# Patient Record
Sex: FEMALE | Race: WHITE | NOT HISPANIC OR LATINO | Employment: FULL TIME | URBAN - METROPOLITAN AREA
[De-identification: names, ages, dates, MRNs, and addresses within clinical notes are randomized per-mention and may not be internally consistent; named-entity substitution may affect disease eponyms.]

---

## 2019-04-08 ENCOUNTER — TELEPHONE (OUTPATIENT)
Dept: OBGYN CLINIC | Facility: CLINIC | Age: 44
End: 2019-04-08

## 2019-05-01 ENCOUNTER — OFFICE VISIT (OUTPATIENT)
Dept: OBGYN CLINIC | Facility: CLINIC | Age: 44
End: 2019-05-01
Payer: COMMERCIAL

## 2019-05-01 VITALS
WEIGHT: 162 LBS | BODY MASS INDEX: 26.99 KG/M2 | HEIGHT: 65 IN | DIASTOLIC BLOOD PRESSURE: 80 MMHG | SYSTOLIC BLOOD PRESSURE: 116 MMHG

## 2019-05-01 DIAGNOSIS — N92.0 MENORRHAGIA WITH REGULAR CYCLE: ICD-10-CM

## 2019-05-01 DIAGNOSIS — B37.9 YEAST INFECTION: Primary | ICD-10-CM

## 2019-05-01 PROCEDURE — 99204 OFFICE O/P NEW MOD 45 MIN: CPT | Performed by: PHYSICIAN ASSISTANT

## 2019-05-01 RX ORDER — FLUCONAZOLE 150 MG/1
150 TABLET ORAL EVERY OTHER DAY
Qty: 2 TABLET | Refills: 0 | Status: SHIPPED | OUTPATIENT
Start: 2019-05-01 | End: 2019-05-04

## 2019-06-06 ENCOUNTER — ANNUAL EXAM (OUTPATIENT)
Dept: OBGYN CLINIC | Facility: CLINIC | Age: 44
End: 2019-06-06
Payer: COMMERCIAL

## 2019-06-06 VITALS
BODY MASS INDEX: 28 KG/M2 | WEIGHT: 164 LBS | SYSTOLIC BLOOD PRESSURE: 106 MMHG | HEIGHT: 64 IN | DIASTOLIC BLOOD PRESSURE: 68 MMHG

## 2019-06-06 DIAGNOSIS — N94.6 DYSMENORRHEA: ICD-10-CM

## 2019-06-06 DIAGNOSIS — Z12.31 ENCOUNTER FOR SCREENING MAMMOGRAM FOR MALIGNANT NEOPLASM OF BREAST: ICD-10-CM

## 2019-06-06 DIAGNOSIS — Z01.419 GYNECOLOGIC EXAM NORMAL: Primary | ICD-10-CM

## 2019-06-06 PROCEDURE — S0612 ANNUAL GYNECOLOGICAL EXAMINA: HCPCS | Performed by: PHYSICIAN ASSISTANT

## 2019-06-13 ENCOUNTER — TELEPHONE (OUTPATIENT)
Dept: OBGYN CLINIC | Facility: CLINIC | Age: 44
End: 2019-06-13

## 2019-06-13 DIAGNOSIS — N92.0 MENORRHAGIA WITH REGULAR CYCLE: ICD-10-CM

## 2019-06-13 DIAGNOSIS — N94.6 DYSMENORRHEA: Primary | ICD-10-CM

## 2019-06-13 RX ORDER — DESOGESTREL AND ETHINYL ESTRADIOL 21-5 (28)
1 KIT ORAL DAILY
Qty: 28 TABLET | Refills: 3 | Status: SHIPPED | OUTPATIENT
Start: 2019-06-13 | End: 2019-09-19 | Stop reason: SDUPTHER

## 2019-06-14 ENCOUNTER — HOSPITAL ENCOUNTER (OUTPATIENT)
Dept: MAMMOGRAPHY | Facility: HOSPITAL | Age: 44
Discharge: HOME | End: 2019-06-14
Attending: PHYSICIAN ASSISTANT
Payer: COMMERCIAL

## 2019-06-14 VITALS — HEIGHT: 64 IN | BODY MASS INDEX: 28.17 KG/M2 | WEIGHT: 165 LBS

## 2019-06-14 DIAGNOSIS — Z12.31 ENCOUNTER FOR SCREENING MAMMOGRAM FOR MALIGNANT NEOPLASM OF BREAST: ICD-10-CM

## 2019-06-14 PROCEDURE — 77067 SCR MAMMO BI INCL CAD: CPT

## 2019-06-14 PROCEDURE — 77063 BREAST TOMOSYNTHESIS BI: CPT

## 2019-06-18 LAB
HPV HR 12 DNA CVX QL NAA+PROBE: DETECTED
HPV16 DNA SPEC QL NAA+PROBE: NOT DETECTED
HPV18 DNA SPEC QL NAA+PROBE: NOT DETECTED
THIN PREP CVX: ABNORMAL

## 2019-06-26 ENCOUNTER — TELEPHONE (OUTPATIENT)
Dept: OBGYN CLINIC | Facility: CLINIC | Age: 44
End: 2019-06-26

## 2019-07-05 ENCOUNTER — TELEPHONE (OUTPATIENT)
Dept: MAMMOGRAPHY | Facility: CLINIC | Age: 44
End: 2019-07-05

## 2019-07-12 ENCOUNTER — TELEPHONE (OUTPATIENT)
Dept: MAMMOGRAPHY | Facility: CLINIC | Age: 44
End: 2019-07-12

## 2019-07-12 ENCOUNTER — TELEPHONE (OUTPATIENT)
Dept: OBGYN CLINIC | Facility: CLINIC | Age: 44
End: 2019-07-12

## 2019-07-12 NOTE — TELEPHONE ENCOUNTER
Epic email sent to [employer], PA-C,    Luis Bower,     We were unsuccessful in contacting the above patient for her diagnostic follow up mammogram/ultrasound  I have left messages for her on 6/28 and 7/5 with no response to schedule  Please attempt to contact this patient and have them schedule their appointment  Please let me know if you have any questions or if I can be of any further assistance      Thank you,  Nury Gunn, MSN, RN  Breast Nurse Navigator

## 2019-07-12 NOTE — TELEPHONE ENCOUNTER
Spoke with patient, getting frustrated with billing office that she has to pay up front out of pocket prior to having testing done because not medically necessary and that scheduling doesn't know about billing and referred her there  Patient does not want to just go to have testing done without making arrangements because she is afraid of taking the day off of work and getting there and being turned away for lack of payment  Patient still refusing to get testing, stating she doesn't have the money and has max'ed out her care credit  States "will not stress over it and just die because she doesn't have the money"  Pt aware will call and speak with  and see if there are any other options as this is recommended followup testing that she should really have done  [address] and spoke with a woman, Elisa Swenson (I think), and explained the situation as is needing follow up testing  Suggested that I give her the phone number to [facility] as they have programs in place to help patients with high deductibles to get testing done  States there is a jared in place and that the women come to [facility] and perform the tests there and help with payment  Was told to have patient call either John Paul Woody or Pedro at [phone]  Called and left details on patient vm and to please consider calling and having the necessary testing done

## 2019-07-12 NOTE — TELEPHONE ENCOUNTER
----- Message from Abhi Baez PA-C sent at 7/12/2019 10:27 AM EDT -----  Regarding: FW: Unable to contact patient      ----- Message -----  From: Lobo Leblanc RN  Sent: 7/12/2019  10:02 AM EDT  To: Abhi Baez PA-C  Subject: Unable to contact patient                        Luis Bower,     We were unsuccessful in contacting the above patient for her diagnostic follow up mammogram/ultrasound  I have left messages for her on 6/28 and 7/5 with no response to schedule  Please attempt to contact this patient and have them schedule their appointment  Please let me know if you have any questions or if I can be of any further assistance      Thank you,  Marlen Medina, MSN, RN  Breast Nurse Navigator

## 2019-07-25 ENCOUNTER — HOSPITAL ENCOUNTER (OUTPATIENT)
Dept: ULTRASOUND IMAGING | Facility: CLINIC | Age: 44
Discharge: HOME | End: 2019-07-25

## 2019-07-25 ENCOUNTER — HOSPITAL ENCOUNTER (OUTPATIENT)
Dept: MAMMOGRAPHY | Facility: CLINIC | Age: 44
Discharge: HOME | End: 2019-07-25

## 2019-07-25 VITALS — BODY MASS INDEX: 28.17 KG/M2 | HEIGHT: 64 IN | WEIGHT: 165 LBS

## 2019-07-25 DIAGNOSIS — R92.8 ABNORMAL MAMMOGRAM: ICD-10-CM

## 2019-07-25 PROCEDURE — 77066 DX MAMMO INCL CAD BI: CPT

## 2019-07-25 PROCEDURE — G0279 TOMOSYNTHESIS, MAMMO: HCPCS

## 2019-07-25 PROCEDURE — 76642 ULTRASOUND BREAST LIMITED: CPT

## 2019-07-25 NOTE — PROGRESS NOTES
Met with patient and Dr Juan Vargas regarding recommendation for;      __x___ RIGHT ______LEFT      __x___Ultrasound guided  ______Stereotactic  Breast biopsy  __x___Verbalized understanding        Blood thinners:  _____yes __x___no    Date stopped: ___n/a________    Biopsy teaching sheet given:  ___x____yes ______no

## 2019-08-09 ENCOUNTER — HOSPITAL ENCOUNTER (OUTPATIENT)
Dept: MAMMOGRAPHY | Facility: CLINIC | Age: 44
Discharge: HOME | End: 2019-08-09

## 2019-08-09 ENCOUNTER — HOSPITAL ENCOUNTER (OUTPATIENT)
Dept: ULTRASOUND IMAGING | Facility: CLINIC | Age: 44
Discharge: HOME | End: 2019-08-09
Admitting: RADIOLOGY
Payer: COMMERCIAL

## 2019-08-09 VITALS — DIASTOLIC BLOOD PRESSURE: 88 MMHG | SYSTOLIC BLOOD PRESSURE: 129 MMHG | HEART RATE: 69 BPM

## 2019-08-09 DIAGNOSIS — R92.8 ABNORMAL ULTRASOUND OF BREAST: ICD-10-CM

## 2019-08-09 PROCEDURE — 19084 BX BREAST ADD LESION US IMAG: CPT

## 2019-08-09 PROCEDURE — 88305 TISSUE EXAM BY PATHOLOGIST: CPT | Performed by: PATHOLOGY

## 2019-08-09 PROCEDURE — 19083 BX BREAST 1ST LESION US IMAG: CPT

## 2019-08-09 RX ORDER — LIDOCAINE HYDROCHLORIDE 10 MG/ML
5 INJECTION, SOLUTION INFILTRATION; PERINEURAL ONCE
Status: COMPLETED | OUTPATIENT
Start: 2019-08-09 | End: 2019-08-09

## 2019-08-09 RX ADMIN — LIDOCAINE HYDROCHLORIDE 5 ML: 10 INJECTION, SOLUTION INFILTRATION; PERINEURAL at 12:28

## 2019-08-09 RX ADMIN — LIDOCAINE HYDROCHLORIDE 5 ML: 10 INJECTION, SOLUTION INFILTRATION; PERINEURAL at 12:38

## 2019-08-09 NOTE — DISCHARGE INSTR - OTHER ORDERS
POST LARGE CORE BREAST BIOPSY PATIENT INFORMATION      1  Place an ice pack inside your bra over the top of the dressing every hour for 20 minutes (20 minutes on, 60 minutes off)  Do this until bedtime  2  Do not shower or bathe until the following morning  3  You may bathe your breast carefully with the steri-strips in place  Be careful    Not to loosen them  The steri-strips will fall off in 3-5 days  4  You may have mild discomfort, and you may have some bruising where the   Needle entered the skin  This should clear within 5-7 days  5  If you need medicine for discomfort, take acetaminophen products such as   Tylenol  You may also take Advil or Motrin products  6  Do not participate in strenuous activities such as-tennis, aerobics, skiing,  Weight lifting, etc  for 24 hours  Refrain from swimming/soaking for 72 hours  7  Wearing a bra for sleeping may be more comfortable for the first 24-48 hours  8  Watch for continued bleeding, pain or fever over 101; please call with any questions or concerns  For procedures done at the [facility]  [facility] [address] call:  Roverto Johnson RN at [phone]  Noreen Cabot RN at [phone]                    *After 4 PM call the Interventional Radiology Department                    [phone] and ask to speak with the nurse on call  For procedures done at the [address] [place] [address] call:         Marc Hernandez RN at   *After 4 PM call the Interventional Radiology Department   [phone] and ask to speak with the nurse on call  For procedures done at [address] [place] [address] call: The Radiology Nurse at [phone]  *After 4 PM call your physician, or go to the Emergency Department  9          The final results of your biopsy are usually available within one week

## 2019-08-09 NOTE — PROGRESS NOTES
Ice pack given:    __x___yes _____no    Discharge instructions signed by patient:    ___x__yes _____no    Discharge instructions given to patient:    __x___yes _____no    Discharged via:    ___x__amulatory    _____wheelchair    _____stretcher    Stable on discharge:    ___x__yes ____no  Patient would like results over the phone

## 2019-08-09 NOTE — PROGRESS NOTES
Procedure type: Site A     __x___ultrasound guided _____stereotactic    Breast:    _____Left __x___Right    Location: 9 o'clock 4 CMFN    Needle: 12 Gauge patience     # of passes: 4    Clip: Hydromark Butterfly    Performed by: Dr Adeline Vasquez held for 5 minutes by: Burt Helton     Stercezar Strips:    ___x__yes _____no    Band aid:    ___x__yes_____no    Tape and guaze:    _____yes __x___no    Tolerated procedure:    ___x__yes _____no

## 2019-08-09 NOTE — PROGRESS NOTES
Procedure type: Site B    __x__ultrasound guided _____stereotactic    Breast:    _____Left ___x__Right    Location: 7 o'clock 3 CMFN    Needle: 12 Gauge patience    # of passes: 3    Clip: Hydromark Open Coil     Performed by: Dr Margi Boone held for 5 minutes by: Tye Workman Strips:    ___x__yes _____no    Band aid:    __x___yes_____no    Tape and guaze:    _____yes __x___no    Tolerated procedure:    ___x__yes _____no

## 2019-08-09 NOTE — PROGRESS NOTES
Patient arrived via:    ___x__ambulatory    _____wheelchair    _____stretcher      Domestic violence screen    ___x___negative______positive    Breast Implants:    _______yes ____x____no

## 2019-08-13 ENCOUNTER — TELEPHONE (OUTPATIENT)
Dept: MAMMOGRAPHY | Facility: CLINIC | Age: 44
End: 2019-08-13

## 2019-08-20 ENCOUNTER — TELEPHONE (OUTPATIENT)
Dept: OBGYN CLINIC | Facility: CLINIC | Age: 44
End: 2019-08-20

## 2019-08-20 NOTE — TELEPHONE ENCOUNTER
Patient has a pill check for 9/17/19 with you but wanted to know she can call in with BP  Patient stated she pays a lot out of pocket for the appts when ever she comes

## 2019-08-21 NOTE — TELEPHONE ENCOUNTER
Called patient  LMJURGEN with details that patient can call with BP in September and cancel pill check at office  I advised the patient to call the office to verify if she wanted to cancel the appt and do the call in of the BP  I stated I will keep the appt in the schedule until she calls to verify that she got the vm

## 2019-09-18 DIAGNOSIS — N94.6 DYSMENORRHEA: ICD-10-CM

## 2019-09-18 DIAGNOSIS — N92.0 MENORRHAGIA WITH REGULAR CYCLE: ICD-10-CM

## 2019-09-19 DIAGNOSIS — N94.6 DYSMENORRHEA: ICD-10-CM

## 2019-09-19 DIAGNOSIS — N92.0 MENORRHAGIA WITH REGULAR CYCLE: ICD-10-CM

## 2019-09-19 RX ORDER — DESOGESTREL AND ETHINYL ESTRADIOL 21-5 (28)
1 KIT ORAL DAILY
Qty: 90 TABLET | Refills: 0 | Status: SHIPPED | OUTPATIENT
Start: 2019-09-19 | End: 2019-12-24

## 2019-09-19 RX ORDER — DESOGESTREL AND ETHINYL ESTRADIOL 21-5 (28)
1 KIT ORAL DAILY
Qty: 28 TABLET | Refills: 3 | Status: SHIPPED | OUTPATIENT
Start: 2019-09-19 | End: 2019-09-19 | Stop reason: SDUPTHER

## 2019-09-19 NOTE — TELEPHONE ENCOUNTER
LMOM  Advising pt that we are refilling for 3 months then would like another BP check prior to refilling again  If pt had any questions to contact the office

## 2019-09-19 NOTE — TELEPHONE ENCOUNTER
I will refill for 3 months  Have her recheck in 3 months and call it in, if continue to trend upwards we should consider another method

## 2019-12-12 ENCOUNTER — TELEPHONE (OUTPATIENT)
Dept: OBGYN CLINIC | Facility: CLINIC | Age: 44
End: 2019-12-12

## 2019-12-12 DIAGNOSIS — Z30.41 ENCOUNTER FOR SURVEILLANCE OF CONTRACEPTIVE PILLS: Primary | ICD-10-CM

## 2019-12-12 RX ORDER — ACETAMINOPHEN AND CODEINE PHOSPHATE 120; 12 MG/5ML; MG/5ML
1 SOLUTION ORAL DAILY
Qty: 28 TABLET | Refills: 3 | Status: SHIPPED | OUTPATIENT
Start: 2019-12-12 | End: 2020-04-03 | Stop reason: SDUPTHER

## 2019-12-12 NOTE — TELEPHONE ENCOUNTER
At this time we need to get her off this OCP as it seems as though her BP is rising  Recommend switching to progesterone only pill  Should still help with her cramping but will not increase her risk of blood clot  Review there is no week to bleed on this pill but still may have a menses  Needs to take at the same time everyday  Call office if having any issues

## 2019-12-12 NOTE — TELEPHONE ENCOUNTER
Pt requested med, wants to think about it prior to starting however   Doesn't want the new med because her insurance likely will not cover, states she has "crap insurance"

## 2019-12-12 NOTE — TELEPHONE ENCOUNTER
Reviewed with pt  Pt would like a pill that gives her a "normal" period so she can plan around the menses  Offered pt Micronor which pt will think about  Discussed with Cheli who states pt can try Slynd as an option  LMOM with pt to contact office regarding which progesterone only pill she'd like sent into the pharmacy

## 2019-12-12 NOTE — TELEPHONE ENCOUNTER
Pt calling to give BP reading with BC     Checked prior to calling office 12/12 at 10:30 am BP: 142/99 HR:69

## 2019-12-12 NOTE — TELEPHONE ENCOUNTER
Pt called back and requested the micronor due to her "crap insurance"  Doesn't think the other will be covered  rx sent to provider to sign  Pt then called right back, still wants rx sent in but  will call to see if the other is covered with their insurance

## 2019-12-24 ENCOUNTER — TELEPHONE (OUTPATIENT)
Dept: OBGYN CLINIC | Facility: CLINIC | Age: 44
End: 2019-12-24

## 2019-12-24 NOTE — TELEPHONE ENCOUNTER
We put her on the progesterone only pill  Please be sure that is what the pharmacy gave her  That will not increase her BP  Patient needs to continue to follow with PCP to treat hypertension  Safe for her to continue progesterone only pill

## 2019-12-24 NOTE — TELEPHONE ENCOUNTER
Patient switched birth control pills due to blood pressure  Went to pcp yesterday and bp is higher yet  Started new birth control last week  Was ordered blood work too for verification nothing else is going on  Aware need to know what bp was at pcp since patient thought it was a number that was not high   Will call back

## 2020-02-25 ENCOUNTER — TRANSCRIBE ORDERS (OUTPATIENT)
Dept: MAMMOGRAPHY | Facility: CLINIC | Age: 45
End: 2020-02-25

## 2020-02-25 ENCOUNTER — HOSPITAL ENCOUNTER (OUTPATIENT)
Dept: ULTRASOUND IMAGING | Facility: CLINIC | Age: 45
Discharge: HOME | End: 2020-02-25

## 2020-02-25 VITALS — HEIGHT: 64 IN | BODY MASS INDEX: 32.44 KG/M2 | WEIGHT: 190 LBS

## 2020-02-25 DIAGNOSIS — R92.8 ABNORMAL ULTRASOUND OF BREAST: ICD-10-CM

## 2020-02-25 PROCEDURE — 76642 ULTRASOUND BREAST LIMITED: CPT

## 2020-02-26 DIAGNOSIS — R92.8 FOLLOW-UP EXAMINATION OF ABNORMAL MAMMOGRAM: ICD-10-CM

## 2020-02-26 DIAGNOSIS — Z12.31 ENCOUNTER FOR SCREENING MAMMOGRAM FOR BREAST CANCER: Primary | ICD-10-CM

## 2020-04-03 ENCOUNTER — TELEPHONE (OUTPATIENT)
Dept: OBGYN CLINIC | Facility: CLINIC | Age: 45
End: 2020-04-03

## 2020-04-03 DIAGNOSIS — Z30.41 ENCOUNTER FOR SURVEILLANCE OF CONTRACEPTIVE PILLS: ICD-10-CM

## 2020-04-03 RX ORDER — ACETAMINOPHEN AND CODEINE PHOSPHATE 120; 12 MG/5ML; MG/5ML
1 SOLUTION ORAL DAILY
Qty: 90 TABLET | Refills: 0 | Status: SHIPPED | OUTPATIENT
Start: 2020-04-03 | End: 2020-06-26 | Stop reason: SDUPTHER

## 2020-06-25 ENCOUNTER — TELEPHONE (OUTPATIENT)
Dept: OBGYN CLINIC | Facility: CLINIC | Age: 45
End: 2020-06-25

## 2020-06-26 DIAGNOSIS — Z30.41 ENCOUNTER FOR SURVEILLANCE OF CONTRACEPTIVE PILLS: ICD-10-CM

## 2020-06-26 RX ORDER — ACETAMINOPHEN AND CODEINE PHOSPHATE 120; 12 MG/5ML; MG/5ML
1 SOLUTION ORAL DAILY
Qty: 90 TABLET | Refills: 0 | Status: SHIPPED | OUTPATIENT
Start: 2020-06-26 | End: 2020-06-30 | Stop reason: SDUPTHER

## 2020-06-30 ENCOUNTER — TELEPHONE (OUTPATIENT)
Dept: OBGYN CLINIC | Facility: CLINIC | Age: 45
End: 2020-06-30

## 2020-06-30 DIAGNOSIS — Z30.41 ENCOUNTER FOR SURVEILLANCE OF CONTRACEPTIVE PILLS: ICD-10-CM

## 2020-06-30 RX ORDER — ACETAMINOPHEN AND CODEINE PHOSPHATE 120; 12 MG/5ML; MG/5ML
1 SOLUTION ORAL DAILY
Qty: 90 TABLET | Refills: 0 | Status: SHIPPED | OUTPATIENT
Start: 2020-06-30 | End: 2020-09-14 | Stop reason: SDUPTHER

## 2020-09-09 NOTE — PROGRESS NOTES
Assessment/Plan   Diagnoses and all orders for this visit:    Vaginal odor  -     Genital Comprehensive Culture    Gynecologic exam normal  -     Liquid-based pap, screening  -     Genital Comprehensive Culture    Encounter for surveillance of contraceptive pills  -     norethindrone (MICRONOR) 0 35 MG tablet; Take 1 tablet (0 35 mg total) by mouth daily        Discussion    All questions have been answered to her satisfaction  RTO for APE or sooner if needed      Subjective     HPI   Bennie Pereira is a 41 y o  female who presents for annual well woman exam    LMP - 21 gets normal monthly period, bleeding lasts 4-5 days changes pad 5 times per day  Periods much better controlled than last year  Some occasional vulvar itch/burn; No vaginal itch/burn; No abn discharge or odor; No urinary sx - burning/pain/frequency/hematuria  (+) SBEs - pt questions need for recurrent US and mammos and testing at breast care center  I r/w pt importance of screening and surveillance given h/o breast masses w biopsy last summer  No abd/pelvic pain or HAs; No menopausal symptoms: No hot flashes/night sweats, problems with intercourse, vaginal dryness; sleeping well  Pt is sexually active in a mutually monog/ sexual relationship x 10 years; No issues with intercourse; She declines std/hiv/hep testing; Feels safe at home  Contraception: POP  (+) PCP for routine Bw/care; Last Pap - 19 WNL + HPV   Last mammo - 2020 US  Last colonoscopy - none yet      Review of Systems   Constitutional: Negative  Respiratory: Negative  Gastrointestinal: Negative  Endocrine: Negative  Genitourinary: Negative          The following portions of the patient's history were reviewed and updated as appropriate: current medications, past family history, past medical history, past social history, past surgical history and problem list          OB History        1    Para   1    Term   1            AB Living   1       SAB        TAB        Ectopic        Multiple        Live Births   1                 Past Medical History:   Diagnosis Date    Depression        Past Surgical History:   Procedure Laterality Date    BREAST BIOPSY Left     US GUIDANCE BREAST BIOPSY RIGHT EACH ADDITIONAL Right 2019    US GUIDED BREAST BIOPSY RIGHT COMPLETE Right 2019       Family History   Problem Relation Age of Onset    Hypertension Mother     Hypertension Father     Diabetes Maternal Grandmother     Hypertension Maternal Grandmother     Diabetes Maternal Grandfather     Hypertension Maternal Grandfather     Diabetes Paternal Grandmother     Heart disease Paternal Grandmother     Hypertension Paternal Grandmother     Diabetes Paternal Grandfather     Hypertension Paternal Grandfather     Lung cancer Maternal Aunt     No Known Problems Sister     No Known Problems Daughter     No Known Problems Maternal Aunt     No Known Problems Maternal Aunt     No Known Problems Maternal Aunt     No Known Problems Maternal Aunt     No Known Problems Maternal Aunt     No Known Problems Paternal Aunt     No Known Problems Paternal Aunt     No Known Problems Paternal Aunt        Social History     Socioeconomic History    Marital status: /Civil Union     Spouse name: Not on file    Number of children: Not on file    Years of education: Not on file    Highest education level: Not on file   Occupational History    Not on file   Social Needs    Financial resource strain: Not on file    Food insecurity     Worry: Not on file     Inability: Not on file   [employer] needs     Medical: Not on file     Non-medical: Not on file   Tobacco Use    Smoking status: Former Smoker     Last attempt to quit:      Years since quittin 7    Smokeless tobacco: Never Used    Tobacco comment: social as teenager   Substance and Sexual Activity    Alcohol use: Yes     Frequency: 2-4 times a month     Drinks per session: 1 or 2     Binge frequency: Never    Drug use: Never    Sexual activity: Yes     Partners: Male     Birth control/protection: OCP   Lifestyle    Physical activity     Days per week: Not on file     Minutes per session: Not on file    Stress: Not on file   Relationships    Social connections     Talks on phone: Not on file     Gets together: Not on file     Attends Tenriism service: Not on file     Active member of club or organization: Not on file     Attends meetings of clubs or organizations: Not on file     Relationship status: Not on file    Intimate partner violence     Fear of current or ex partner: Not on file     Emotionally abused: Not on file     Physically abused: Not on file     Forced sexual activity: Not on file   Other Topics Concern    Not on file   Social History Narrative    Not on file         Current Outpatient Medications:     norethindrone (MICRONOR) 0 35 MG tablet, Take 1 tablet (0 35 mg total) by mouth daily, Disp: 90 tablet, Rfl: 3    No Known Allergies    Objective   Vitals:    09/14/20 0838   BP: 128/82   BP Location: Left arm   Patient Position: Sitting   Cuff Size: Standard   Temp: 98 3 °F (36 8 °C)   Weight: 96 6 kg (213 lb)   Height: 5' 4" (1 626 m)     Physical Exam  Constitutional:       Appearance: She is well-developed  HENT:      Head: Normocephalic and atraumatic  Cardiovascular:      Rate and Rhythm: Normal rate and regular rhythm  Pulmonary:      Effort: Pulmonary effort is normal       Breath sounds: Normal breath sounds  Chest:      Breasts: Breasts are symmetrical          Right: No inverted nipple, mass, nipple discharge, skin change or tenderness  Left: No inverted nipple, mass, nipple discharge, skin change or tenderness  Abdominal:      General: There is no distension  Palpations: Abdomen is soft  There is no mass  Tenderness: There is no guarding or rebound  Genitourinary:     Exam position: Supine        Labia: Right: No rash  Left: No rash  Vagina: No signs of injury and foreign body  No vaginal discharge or erythema  Cervix: No cervical motion tenderness  Adnexa:         Right: No mass  Left: No mass  Rectum: Guaiac result negative  Skin:     General: Skin is warm and dry  Neurological:      Mental Status: She is alert and oriented to person, place, and time  Patient Instructions   Pap done today  R/w pt importance of mammos screening and US  Pt states she wasn't aware she had an abnormality and that was why they were following her mammos/US

## 2020-09-14 ENCOUNTER — ANNUAL EXAM (OUTPATIENT)
Dept: OBGYN CLINIC | Facility: CLINIC | Age: 45
End: 2020-09-14
Payer: COMMERCIAL

## 2020-09-14 VITALS
TEMPERATURE: 98.3 F | HEIGHT: 64 IN | BODY MASS INDEX: 36.37 KG/M2 | WEIGHT: 213 LBS | DIASTOLIC BLOOD PRESSURE: 82 MMHG | SYSTOLIC BLOOD PRESSURE: 128 MMHG

## 2020-09-14 DIAGNOSIS — Z30.41 ENCOUNTER FOR SURVEILLANCE OF CONTRACEPTIVE PILLS: ICD-10-CM

## 2020-09-14 DIAGNOSIS — Z01.419 GYNECOLOGIC EXAM NORMAL: ICD-10-CM

## 2020-09-14 DIAGNOSIS — N89.8 VAGINAL ODOR: Primary | ICD-10-CM

## 2020-09-14 PROCEDURE — G0145 SCR C/V CYTO,THINLAYER,RESCR: HCPCS | Performed by: PHYSICIAN ASSISTANT

## 2020-09-14 PROCEDURE — S0612 ANNUAL GYNECOLOGICAL EXAMINA: HCPCS | Performed by: PHYSICIAN ASSISTANT

## 2020-09-14 PROCEDURE — 87070 CULTURE OTHR SPECIMN AEROBIC: CPT | Performed by: PHYSICIAN ASSISTANT

## 2020-09-14 PROCEDURE — 87624 HPV HI-RISK TYP POOLED RSLT: CPT | Performed by: PHYSICIAN ASSISTANT

## 2020-09-14 PROCEDURE — 87106 FUNGI IDENTIFICATION YEAST: CPT | Performed by: PHYSICIAN ASSISTANT

## 2020-09-14 PROCEDURE — 87147 CULTURE TYPE IMMUNOLOGIC: CPT | Performed by: PHYSICIAN ASSISTANT

## 2020-09-14 RX ORDER — ACETAMINOPHEN AND CODEINE PHOSPHATE 120; 12 MG/5ML; MG/5ML
1 SOLUTION ORAL DAILY
Qty: 90 TABLET | Refills: 3 | Status: SHIPPED | OUTPATIENT
Start: 2020-09-14 | End: 2021-07-19

## 2020-09-14 NOTE — PATIENT INSTRUCTIONS
Pap done today  R/w pt importance of mammos screening and US  Pt states she wasn't aware she had an abnormality and that was why they were following her mammos/US

## 2020-09-15 RX ORDER — ACETAMINOPHEN AND CODEINE PHOSPHATE 120; 12 MG/5ML; MG/5ML
SOLUTION ORAL
Qty: 84 TABLET | Refills: 0 | OUTPATIENT
Start: 2020-09-15

## 2020-09-17 DIAGNOSIS — B37.3 YEAST VAGINITIS: Primary | ICD-10-CM

## 2020-09-17 LAB
BACTERIA GENITAL AEROBE CULT: ABNORMAL
HPV HR 12 DNA CVX QL NAA+PROBE: NEGATIVE
HPV16 DNA CVX QL NAA+PROBE: NEGATIVE
HPV18 DNA CVX QL NAA+PROBE: NEGATIVE
LAB AP GYN PRIMARY INTERPRETATION: NORMAL
Lab: NORMAL
PATH INTERP SPEC-IMP: NORMAL

## 2020-09-17 RX ORDER — FLUCONAZOLE 150 MG/1
150 TABLET ORAL EVERY OTHER DAY
Qty: 2 TABLET | Refills: 0 | Status: SHIPPED | OUTPATIENT
Start: 2020-09-17 | End: 2020-09-20

## 2021-07-19 DIAGNOSIS — Z30.41 ENCOUNTER FOR SURVEILLANCE OF CONTRACEPTIVE PILLS: ICD-10-CM

## 2021-07-19 RX ORDER — NORETHINDRONE 0.35 MG/1
TABLET ORAL
Qty: 28 TABLET | Refills: 8 | Status: SHIPPED | OUTPATIENT
Start: 2021-07-19 | End: 2022-02-02

## 2022-02-02 DIAGNOSIS — Z30.41 ENCOUNTER FOR SURVEILLANCE OF CONTRACEPTIVE PILLS: ICD-10-CM

## 2022-02-02 RX ORDER — ACETAMINOPHEN AND CODEINE PHOSPHATE 120; 12 MG/5ML; MG/5ML
SOLUTION ORAL
Qty: 84 TABLET | Refills: 2 | Status: SHIPPED | OUTPATIENT
Start: 2022-02-02

## 2022-10-15 DIAGNOSIS — Z30.41 ENCOUNTER FOR SURVEILLANCE OF CONTRACEPTIVE PILLS: ICD-10-CM

## 2022-10-17 RX ORDER — ACETAMINOPHEN AND CODEINE PHOSPHATE 120; 12 MG/5ML; MG/5ML
SOLUTION ORAL
Qty: 84 TABLET | Refills: 2 | Status: SHIPPED | OUTPATIENT
Start: 2022-10-17

## 2022-10-28 DIAGNOSIS — N64.4 BREAST PAIN IN FEMALE: Primary | ICD-10-CM

## 2022-10-28 DIAGNOSIS — Z12.31 BREAST CANCER SCREENING BY MAMMOGRAM: Primary | ICD-10-CM

## 2022-10-28 NOTE — PROGRESS NOTES
Patient calling in because she needed the mammogram order changed to diagnostic and not screening due to breast pain

## 2023-01-30 NOTE — PROGRESS NOTES
Assessment/Plan   Problem List Items Addressed This Visit        Other    Menorrhagia with regular cycle    Relevant Orders    US pelvis complete w transvaginal   Other Visit Diagnoses     Well woman exam    -  Primary    Encounter for surveillance of contraceptive pills        Relevant Medications    norethindrone (MICRONOR) 0 35 MG tablet    Screening examination for STD (sexually transmitted disease)        Relevant Orders    Hepatitis B surface antigen    Hepatitis C antibody    RPR    Chlamydia/GC amplified DNA by PCR    Trichomonas vaginalis Thin prep    HIV 1/2 AG/AB w Reflex [facility] for 2 yr old and above    Yeast vaginitis        Relevant Medications    fluconazole (DIFLUCAN) 150 mg tablet          Discussion    All questions have been answered to her satisfaction  RTO for APE or sooner if needed  Pap deferred, cx's done  Will tx with Diflucan based on vaginal discharge  Will consider pelvic US if covered by insurance  Hand out given on Mirena, will consider as well and call if she has additional questions or she desires to schedule  Subjective     HPI   Juan Menjivar is a 56 y o  female who presents for annual well woman exam    LMP - 1/15/23; Periods coming monthly last approx 4-7 days  They are heavier, will change a pad 4-5 times per day on heaviest day, this is not new for her  No significant clotting  Sometimes will have cramping although not significant  We reviewed NSAIDs as an option to consider to help limit cramping  We did further review IUD as an option as well to help limit bleeding  Pt previously was not interested in IUD due to insurance coverage but she may be interested in considering it at this point  No vulvar itch/burn; No vaginal itch/burn; No abn discharge or odor;  No urinary sx - burning/pain/frequency/hematuria    (+) SBEs - no breast masses, asymmetry, nipple discharge or bleeding, changes in skin of breast, or breast tenderness bilaterally    No abd/pelvic pain or HAs; No menopausal symptoms: No hot flashes/night sweats, problems with intercourse, vaginal dryness; sleeping well  Pt is sexually active in a new mutually monog/ sexual relationship for the last few momths; No issues with intercourse; She desires sti/hiv/hep testing; Feels safe at home    (+) PCP for routine Bw/care; Last Pap - 20 WNL neg HPV   History of abnormal Pap smear: had h/o + HPV  Last mammo -  right breat mass-biopsy proven benign  Last colonoscopy -       Review of Systems   Constitutional: Negative  Respiratory: Negative  Gastrointestinal: Negative  Endocrine: Negative  Genitourinary: Negative          The following portions of the patient's history were reviewed and updated as appropriate: allergies, current medications, past family history, past medical history, past social history, past surgical history and problem list          OB History        1    Para   1    Term   1            AB        Living   1       SAB        IAB        Ectopic        Multiple        Live Births   1                 Past Medical History:   Diagnosis Date   • Depression        Past Surgical History:   Procedure Laterality Date   • BREAST BIOPSY Left    • MOUTH SURGERY     • US GUIDANCE BREAST BIOPSY RIGHT EACH ADDITIONAL Right 2019   • US GUIDED BREAST BIOPSY RIGHT COMPLETE Right 2019       Family History   Problem Relation Age of Onset   • Hypertension Mother    • Hypertension Father    • Diabetes Maternal Grandmother    • Hypertension Maternal Grandmother    • Diabetes Maternal Grandfather    • Hypertension Maternal Grandfather    • Diabetes Paternal Grandmother    • Heart disease Paternal Grandmother    • Hypertension Paternal Grandmother    • Diabetes Paternal Grandfather    • Hypertension Paternal Grandfather    • Lung cancer Maternal Aunt    • No Known Problems Sister    • No Known Problems Daughter    • No Known Problems Maternal Aunt    • No Known Problems Maternal Aunt    • No Known Problems Maternal Aunt    • No Known Problems Maternal Aunt    • No Known Problems Maternal Aunt    • No Known Problems Paternal Aunt    • No Known Problems Paternal Aunt    • No Known Problems Paternal Aunt        Social History     Socioeconomic History   • Marital status: /Civil Union     Spouse name: Not on file   • Number of children: Not on file   • Years of education: Not on file   • Highest education level: Not on file   Occupational History   • Not on file   Tobacco Use   • Smoking status: Former     Types: Cigarettes     Quit date:      Years since quittin 1   • Smokeless tobacco: Never   • Tobacco comments:     social as teenager   Vaping Use   • Vaping Use: Never used   Substance and Sexual Activity   • Alcohol use: Yes   • Drug use: Never   • Sexual activity: Yes     Partners: Male     Birth control/protection: OCP   Other Topics Concern   • Not on file   Social History Narrative   • Not on file     Social Determinants of Health     Financial Resource Strain: Not on file   Food Insecurity: Not on file   Transportation Needs: Not on file   Physical Activity: Not on file   Stress: Not on file   Social Connections: Not on file   Intimate Partner Violence: Not on file   Housing Stability: Not on file         Current Outpatient Medications:   •  chlorhexidine (PERIDEX) 0 12 % solution, , Disp: , Rfl:   •  fluconazole (DIFLUCAN) 150 mg tablet, Take 1 tablet (150 mg total) by mouth once for 1 dose, Disp: 1 tablet, Rfl: 0  •  norethindrone (MICRONOR) 0 35 MG tablet, Take 1 tablet (0 35 mg total) by mouth daily, Disp: 84 tablet, Rfl: 4    No Known Allergies    Objective   Vitals:    23 0816   BP: 140/92   BP Location: Left arm   Patient Position: Sitting   Cuff Size: Large   Weight: 98 4 kg (217 lb)   Height: 5' 5" (1 651 m)     Physical Exam  Vitals reviewed  HENT:      Head: Normocephalic and atraumatic     Cardiovascular:      Rate and Rhythm: Normal rate and regular rhythm  Pulmonary:      Effort: Pulmonary effort is normal       Breath sounds: Normal breath sounds  Chest:   Breasts:     Breasts are symmetrical       Right: No swelling, bleeding, inverted nipple, mass, nipple discharge, skin change or tenderness  Left: No swelling, bleeding, inverted nipple, mass, nipple discharge, skin change or tenderness  Abdominal:      General: Abdomen is flat  Bowel sounds are normal       Palpations: Abdomen is soft  Tenderness: There is no abdominal tenderness  There is no right CVA tenderness, left CVA tenderness or guarding  Genitourinary:     General: Normal vulva  Pubic Area: No rash  Labia:         Right: No rash, tenderness, lesion or injury  Left: No rash, tenderness, lesion or injury  Urethra: No prolapse, urethral pain, urethral swelling or urethral lesion  Vagina: No signs of injury and foreign body  Vaginal discharge and erythema present  Cervix: Normal       Uterus: Normal  Enlarged  Adnexa: Right adnexa normal and left adnexa normal       Comments: Small amount of thick chunky cottage cheese like yellowish d/c c/w candida noted on exam   Ut does also feel midline and nontender, mildly enlarged  Musculoskeletal:      Cervical back: Neck supple  Lymphadenopathy:      Upper Body:      Right upper body: No axillary adenopathy  Left upper body: No axillary adenopathy  Skin:     General: Skin is warm and dry  Neurological:      Mental Status: She is alert and oriented to person, place, and time  Psychiatric:         Mood and Affect: Mood normal          Behavior: Behavior normal          Thought Content: Thought content normal          Judgment: Judgment normal          There are no Patient Instructions on file for this visit

## 2023-01-31 ENCOUNTER — ANNUAL EXAM (OUTPATIENT)
Dept: OBGYN CLINIC | Facility: CLINIC | Age: 48
End: 2023-01-31

## 2023-01-31 VITALS
BODY MASS INDEX: 36.15 KG/M2 | HEIGHT: 65 IN | WEIGHT: 217 LBS | DIASTOLIC BLOOD PRESSURE: 92 MMHG | SYSTOLIC BLOOD PRESSURE: 140 MMHG

## 2023-01-31 DIAGNOSIS — N92.0 MENORRHAGIA WITH REGULAR CYCLE: ICD-10-CM

## 2023-01-31 DIAGNOSIS — Z01.419 WELL WOMAN EXAM: Primary | ICD-10-CM

## 2023-01-31 DIAGNOSIS — Z30.41 ENCOUNTER FOR SURVEILLANCE OF CONTRACEPTIVE PILLS: ICD-10-CM

## 2023-01-31 DIAGNOSIS — Z11.3 SCREENING EXAMINATION FOR STD (SEXUALLY TRANSMITTED DISEASE): ICD-10-CM

## 2023-01-31 DIAGNOSIS — B37.31 YEAST VAGINITIS: ICD-10-CM

## 2023-01-31 RX ORDER — CHLORHEXIDINE GLUCONATE 0.12 MG/ML
RINSE ORAL
COMMUNITY
Start: 2023-01-20

## 2023-01-31 RX ORDER — FLUCONAZOLE 150 MG/1
150 TABLET ORAL ONCE
Qty: 1 TABLET | Refills: 0 | Status: SHIPPED | OUTPATIENT
Start: 2023-01-31 | End: 2023-01-31 | Stop reason: SDUPTHER

## 2023-01-31 RX ORDER — ACETAMINOPHEN AND CODEINE PHOSPHATE 120; 12 MG/5ML; MG/5ML
1 SOLUTION ORAL DAILY
Qty: 84 TABLET | Refills: 4 | Status: SHIPPED | OUTPATIENT
Start: 2023-01-31

## 2023-01-31 RX ORDER — FLUCONAZOLE 150 MG/1
150 TABLET ORAL ONCE
Qty: 1 TABLET | Refills: 0 | Status: SHIPPED | OUTPATIENT
Start: 2023-01-31 | End: 2023-01-31

## 2023-01-31 NOTE — PATIENT INSTRUCTIONS
Levonorgestrel (Into the uterus)   Levonorgestrel (zjw-pom-wxh-LORENA-trel)  Prevents pregnancy and treats heavy menstrual bleeding  This is an intrauterine device (IUD), which is a reversible form of birth control  This IUD slowly releases levonorgestrel, a hormone  Brand Name(s): Lisa Sinner, Mirena, [place]   There may be other brand names for this medicine  When This Medicine Should Not Be Used: This device is not right for everyone  Do not use it if you had an allergic reaction to levonorgestrel, silicone, polyethylene, silica, barium sulfate or iron oxide, or if you are pregnant  How to Use This Medicine:   Device  A nurse or other trained health professional will give you this medicine  The IUD is usually inserted by your doctor during your monthly period  You will need to see your doctor 4 to 6 weeks after the IUD is placed and then once a year  Your IUD has a string or "tail" that is made of plastic thread  About one or two inches of this string hangs into your vagina  You cannot see this string, and it will not cause problems when you have sex  Check your IUD after each monthly period  You may not be protected against pregnancy if you cannot feel the string or if you feel plastic  Do the following to check the placement of your IUD:  Wash your hands with soap and warm water  Dry them with a clean towel  Bend your knees and squat low to the ground  Gently put your index finger high inside your vagina  The cervix is at the top of the vagina  Find the IUD string coming from your cervix  Never pull on the string  You should not be able to feel the plastic of the IUD itself  Wash your hands after you are done checking your IUD string  Your doctor will need to remove your IUD after 3 years for Ted, after 5 years for [facility], after 6 years for GARRETT, or after 7 years for Mirena®  You will also need to have it replaced if it comes out of your uterus   If you are using Mirena® or Liletta® and want to stop, your doctor can remove it at any time  However, you may become pregnant as soon as Preeti Lucero, Mirena®, or Izabela Bucyrus is removed or if you have intercourse the week before Kushal Tanner is removed  Use another form of birth control or have a new IUD inserted to keep from getting pregnant  Drugs and Foods to Avoid:   Ask your doctor or pharmacist before using any other medicine, including over-the-counter medicines, vitamins, and herbal products  Some medicines can affect how this device works  Tell your doctor if you are using a blood thinner (including warfarin)  Warnings While Using This Medicine:   Tell your doctor if you have had any problems, infections, or other conditions that affected your reproductive system  There are many problems that could make an IUD a bad choice for you, including if you have fibroids, unexplained bleeding, a uterus that has an unusual shape, a recent infection, a history of pelvic inflammatory disease, an abnormal Pap test, ectopic pregnancy, cancer or suspected cancer, or an existing IUD  Tell your doctor if you are breastfeeding, or if you had a baby, miscarriage, or  in the past 3 months  Tell your doctor if you have liver disease (including tumor or cancer), heart disease, breast cancer, heart or blood circulation problems, migraine, high blood pressure, or a history of heart valve problems, blood clotting problems, stroke, or heart attack  Tell your doctor if you have problems with your immune system or have had surgery on your female organs (especially fallopian tubes)  There is a small chance that you could get pregnant when using an IUD, just as there is with any birth control  If you get pregnant, your doctor may remove your IUD to lower the risk of miscarriage or other problems    This medicine may cause the following problems:  Increased risk of ectopic pregnancy (pregnancy outside the uterus)  Increased risk of serious infections, including sepsis  Increased risk of pelvic inflammatory disease (PID) or endometritis  Perforation (hole in the wall of your uterus), which can damage other organs  Increased risk for ovarian cysts  Increased risk of cancer of the breast, uterus, or cervix  Increased risk of high blood pressure, stroke, heart attack, or clotting problems  Jaundice (yellow skin or eyes)  You might have some spotting and cramping during the first weeks after the IUD has been inserted  These symptoms should decrease or go away within a few weeks up to 6 months  You could have less bleeding or even stop having periods by the end of the first year  Call your doctor if you have a change from your regular bleeding pattern after you have had your IUD for awhile, including more bleeding or if you miss a period (and you were having periods even with your IUD)  An IUD can slip partly or all the way out of your uterus  If this happens, use condoms or another form of birth control, and call your doctor right away  This IUD will not protect you from HIV/AIDS or other sexually transmitted diseases  If you have the [address] [place] [address] or [facility], tell your doctor before you have an MRI test   Your doctor will check your progress and the effects of this medicine at regular visits  Keep all appointments  Possible Side Effects While Using This Medicine:   Call your doctor right away if you notice any of these side effects:   Allergic reaction: Itching or hives, swelling in your face or hands, swelling or tingling in your mouth or throat, chest tightness, trouble breathing  Bloating, stomach or pelvic pain, spasm, tenderness, or cramping that is sudden or severe  Chest pain, problems with speech or walking, numbness or weakness in your arm or leg or on one side of your body  Heavy bleeding from your vagina  Pain during sex, or if your partner feels the hard plastic of the IUD during sex  Severe headache, vision changes  Unusual bleeding, bruising, or weakness  Vaginal discharge that has a bad smell, fever, chills, sores on your genitals  Yellow skin or eyes  If you notice these less serious side effects, talk with your doctor:   Acne, dandruff, oily skin or other skin changes  Breast pain or discomfort  Change in bleeding pattern after the first few months  Dizziness or lightheadedness after IUD is placed  Mild itching around your vagina and genitals  Weight gain  If you notice other side effects that you think are caused by this medicine, tell your doctor  Call your doctor for medical advice about side effects  You may report side effects to FDA at 9-382-FDA-9383    © Copyright [employer] 2022 Information is for End User's use only and may not be sold, redistributed or otherwise used for commercial purposes  The above information is an  only  It is not intended as medical advice for individual conditions or treatments  Talk to your doctor, nurse or pharmacist before following any medical regimen to see if it is safe and effective for you

## 2023-02-01 LAB
C TRACH DNA SPEC QL NAA+PROBE: NEGATIVE
N GONORRHOEA DNA SPEC QL NAA+PROBE: NEGATIVE

## 2023-02-03 LAB
M GENITALIUM DNA SPEC QL NAA+PROBE: NEGATIVE
T VAGINALIS DNA SPEC QL NAA+PROBE: NEGATIVE

## 2023-02-11 ENCOUNTER — AMB VIDEO VISIT (OUTPATIENT)
Dept: OTHER | Facility: HOSPITAL | Age: 48
End: 2023-02-11

## 2023-02-11 DIAGNOSIS — R09.82 PND (POST-NASAL DRIP): Primary | ICD-10-CM

## 2023-02-11 DIAGNOSIS — N39.3 STRESS INCONTINENCE: ICD-10-CM

## 2023-02-11 PROBLEM — Z01.419 GYNECOLOGIC EXAM NORMAL: Status: RESOLVED | Noted: 2019-06-06 | Resolved: 2023-02-11

## 2023-02-11 RX ORDER — BENZONATATE 200 MG/1
200 CAPSULE ORAL 3 TIMES DAILY PRN
Qty: 20 CAPSULE | Refills: 0 | Status: SHIPPED | OUTPATIENT
Start: 2023-02-11

## 2023-02-11 NOTE — PROGRESS NOTES
Video Visit - Gracia Varela 56 y o  female MRN: [id number]    REQUIRED DOCUMENTATION:         1  [facility]  2  Provider located at [address] [place] [address] A[place] [id number] [phone]  3  [facility] provider: Kirstie Aguilar PA-C   4  Identify all parties in room with patient during [facility] visit:  No one else  5  After connecting through [employer], patient was identified by name and date of birth  Patient was then informed that this was a Telemedicine visit and that the exam was being conducted confidentially over secure lines  My office door was closed  No one else was in the room  Patient acknowledged consent and understanding of privacy and security of the Telemedicine visit  I informed the patient that I have reviewed their record in Epic and presented the opportunity for them to ask any questions regarding the visit today  The patient agreed to participate  VITALS: Heart Rate: 72 BPM, Respiratory Rate: 16 RPM, Temperature Unavailable° F, Blood Pressure Unavailable mmHg, Pulse Ox Unavailable % on RA    HPI  Pt reports dry cough intermittently with "attacks" for 2 weeks now with PND x 2 days  Also notes stress incontinence due to cough  Reports her throat feels itchy which triggers the cough  Tried nyquil and dayquil, OTC cough syrup without relief  Used vaporized which helped her sleep  No fevers  Not a smoker  No recent travel  COVID negative  Denies dyspepsia  Denies chest tightness or wheezing  Physical Exam  Constitutional:       General: She is not in acute distress  Appearance: Normal appearance  She is not toxic-appearing  HENT:      Head: Normocephalic and atraumatic  Nose: No rhinorrhea  Mouth/Throat:      Mouth: Mucous membranes are moist    Eyes:      Conjunctiva/sclera: Conjunctivae normal       Comments: glasses   Cardiovascular:      Rate and Rhythm: Normal rate     Pulmonary:      Effort: Pulmonary effort is normal  No respiratory distress  Breath sounds: No wheezing (no gross audible wheeze through computer)  Comments: Barking seal-like cough  Musculoskeletal:      Cervical back: Normal range of motion  Skin:     Findings: No rash (on face or neck)  Neurological:      Mental Status: She is alert  Cranial Nerves: No dysarthria or facial asymmetry  Psychiatric:         Mood and Affect: Mood normal          Behavior: Behavior normal          Diagnoses and all orders for this visit:    PND (post-nasal drip)  -     benzonatate (TESSALON) 200 MG capsule; Take 1 capsule (200 mg total) by mouth 3 (three) times a day as needed for cough  -     brompheniramine-pseudoephedrine-dextromethorphan (DIMETAPP DM) 15-1-5 MG/5ML ELIX; Take 10 mL by mouth every 6 (six) hours as needed for allergies    Stress incontinence      Patient Instructions   Postnasal Drip   AMBULATORY CARE:   Postnasal drip  is a condition that causes a large amount of mucus to collect in your throat or nose  It may also be called upper airway cough syndrome because the mucus causes repeated coughing  You may have a sore throat, or throat tissues may swell  This may feel like a lump in your throat  You may also feel like you need to clear your throat often  Contact your healthcare provider if:   · You have trouble breathing because of the mucus  · You have new or worsening symptoms, even with treatment  · You have signs of an infection, such as yellow or green mucus, or a fever  · You have questions or concerns about your condition or care  Treatment  may include any of the following:  · Medicines  may be given to thin the mucus  You may need to swallow the medicine or use a device to flush your sinuses with liquid squirted into your nose  Nasal sprays may also be needed to keep the tissues in your nose moist  Medicines can also relieve congestion   Allergy medicine may help if your symptoms are caused by seasonal allergies, such as hay fever  You may need medicine to help control GERD  · Antibiotics  may be needed to treat a bacterial infection  Manage postnasal drip:   · Use a humidifier or vaporizer  Use a cool mist humidifier or a vaporizer to increase air moisture in your home  This may make it easier for you to breathe  · Drink more liquids as directed  Liquids help keep your air passages moist and help you cough up mucus  Ask how much liquid to drink each day and which liquids are best for you  · Avoid cold air and dry, heated air  Cold or dry air can trigger postnasal drip  Try to stay inside on cold days, or keep your mouth covered  Do not stay long in areas that have dry, heated air  · Do not smoke, and avoid secondhand smoke  Nicotine and other chemicals in cigarettes and cigars can irritate your throat and make coughing worse  Ask your healthcare provider for information if you currently smoke and need help to quit  E-cigarettes or smokeless tobacco still contain nicotine  Talk to your healthcare provider before you use these products  Follow up with your doctor as directed:  Write down your questions so you remember to ask them during your visits  © Copyright [employer] 2022 Information is for End User's use only and may not be sold, redistributed or otherwise used for commercial purposes  All illustrations and images included in CareNotes® are the copyrighted property of A D A M , Inc  or Suresh Ortega  The above information is an  only  It is not intended as medical advice for individual conditions or treatments  Talk to your doctor, nurse or pharmacist before following any medical regimen to see if it is safe and effective for you

## 2023-02-11 NOTE — CARE ANYWHERE EVISITS
Visit Summary for Radames Glasgow - Gender: Female - Date of Birth: [id number]  Date: [id number] - Duration: 13 minutes  Patient: Radames Glasgow  Provider: Mojgan Crandall PA-C    Patient Contact Information  Address  [address] [place] [address]; [address] [place] [address]  [phone]    Visit Topics  Cold [Added By: Self - 2023-02-11]    Triage Questions   What is your current physical address in the event of a medical emergency? Answer []  Are you allergic to any medications? Answer []  Are you now or could you be pregnant? Answer []  Do you have any immune system compromise or chronic lung   disease? Answer []  Do you have any vulnerable family members in the home (infant, pregnant, cancer, elderly)? Answer []     Conversation Transcripts  [0A][0A] [Notification] You are connected with Mojgan Crandall PA-C, [facility] [5C][[id number]] Soraida Eastman is located in [place]  [3I][[id number]] Soraida Eastman has shared health history  Frutoso Spatz  [0A]    Diagnosis  Postnasal drip  Stress incontinence (female) (male)    Procedures  Value: 35125 Code: CPT-4 UNLISTED E&M SERVICE    Medications Prescribed    No prescriptions ordered    Electronically signed by: Kimberly Salmon(NPI [id number])

## 2023-02-11 NOTE — PATIENT INSTRUCTIONS
Postnasal Drip   AMBULATORY CARE:   Postnasal drip  is a condition that causes a large amount of mucus to collect in your throat or nose  It may also be called upper airway cough syndrome because the mucus causes repeated coughing  You may have a sore throat, or throat tissues may swell  This may feel like a lump in your throat  You may also feel like you need to clear your throat often  Contact your healthcare provider if:   You have trouble breathing because of the mucus  You have new or worsening symptoms, even with treatment  You have signs of an infection, such as yellow or green mucus, or a fever  You have questions or concerns about your condition or care  Treatment  may include any of the following:  Medicines  may be given to thin the mucus  You may need to swallow the medicine or use a device to flush your sinuses with liquid squirted into your nose  Nasal sprays may also be needed to keep the tissues in your nose moist  Medicines can also relieve congestion  Allergy medicine may help if your symptoms are caused by seasonal allergies, such as hay fever  You may need medicine to help control GERD  Antibiotics  may be needed to treat a bacterial infection  Manage postnasal drip:   Use a humidifier or vaporizer  Use a cool mist humidifier or a vaporizer to increase air moisture in your home  This may make it easier for you to breathe  Drink more liquids as directed  Liquids help keep your air passages moist and help you cough up mucus  Ask how much liquid to drink each day and which liquids are best for you  Avoid cold air and dry, heated air  Cold or dry air can trigger postnasal drip  Try to stay inside on cold days, or keep your mouth covered  Do not stay long in areas that have dry, heated air  Do not smoke, and avoid secondhand smoke  Nicotine and other chemicals in cigarettes and cigars can irritate your throat and make coughing worse   Ask your healthcare provider for information if you currently smoke and need help to quit  E-cigarettes or smokeless tobacco still contain nicotine  Talk to your healthcare provider before you use these products  Follow up with your doctor as directed:  Write down your questions so you remember to ask them during your visits  © Copyright [employer] 2022 Information is for End User's use only and may not be sold, redistributed or otherwise used for commercial purposes  All illustrations and images included in CareNotes® are the copyrighted property of A D A M , Inc  or [facility] Vidal Rangel   The above information is an  only  It is not intended as medical advice for individual conditions or treatments  Talk to your doctor, nurse or pharmacist before following any medical regimen to see if it is safe and effective for you

## 2023-02-13 ENCOUNTER — AMB VIDEO VISIT (OUTPATIENT)
Dept: OTHER | Facility: HOSPITAL | Age: 48
End: 2023-02-13

## 2023-02-13 VITALS — RESPIRATION RATE: 16 BRPM

## 2023-02-13 DIAGNOSIS — J20.9 ACUTE BRONCHITIS, UNSPECIFIED ORGANISM: Primary | ICD-10-CM

## 2023-02-13 RX ORDER — AZITHROMYCIN 250 MG/1
TABLET, FILM COATED ORAL
Qty: 6 TABLET | Refills: 0 | Status: SHIPPED | OUTPATIENT
Start: 2023-02-13 | End: 2023-02-13

## 2023-02-13 RX ORDER — PREDNISONE 20 MG/1
20 TABLET ORAL DAILY
Qty: 4 TABLET | Refills: 0 | Status: SHIPPED | OUTPATIENT
Start: 2023-02-13 | End: 2023-02-17

## 2023-02-13 NOTE — LETTER
[facility] VISIT VIR  [address] [address]    February 13, 6342     Patient: Shanae Cosme   YOB: 1975   Date of Visit: 2/13/2023       To Whom it May Concern:    Shanae Cosme is under my professional care  She was seen virtually on 2/13/2023  She may return to work on 02/14/2023  If you have any questions or concerns, please don't hesitate to call           Sincerely,          [address] [facility], CRNP

## 2023-02-13 NOTE — PATIENT INSTRUCTIONS
Rest and drink extra fluids  Start antibiotic  Take probiotic  Start prednisone as prescribed  Cool mist humidification can be helpful  Follow up with PCP if no improvement  Go to ER with worsening symptoms  Acute Bronchitis   AMBULATORY CARE:   Acute bronchitis  is swelling and irritation in the air passages of your lungs  This irritation may cause you to cough or have other breathing problems  Acute bronchitis often starts because of another illness, such as a cold or the flu  The illness spreads from your nose and throat to your windpipe and airways  Bronchitis is often called a chest cold  Acute bronchitis lasts about 3 to 6 weeks and is usually not a serious illness  Your cough can last for several weeks  You may have any of the following symptoms:   A cough with sputum that may be clear, yellow, or green    Feeling more tired than usual, and body aches    A fever and chills    Wheezing when you breathe    A tight chest or pain when you breathe or cough  Seek care immediately if:   You cough up blood  Your lips or fingernails turn blue  You feel like you are not getting enough air when you breathe  Contact your healthcare provider if:   You have a fever  Your breathing problems do not go away or get worse  Your cough does not get better within 4 weeks  You have questions or concerns about your condition or care  Self-care:   Get more rest   Rest helps your body to heal  Slowly start to do more each day  Rest when you feel it is needed  Avoid irritants in the air  Avoid chemicals, fumes, and dust  Wear a face mask if you must work around dust or fumes  Stay inside on days when air pollution levels are high  If you have allergies, stay inside when pollen counts are high  Do not use aerosol products, such as spray-on deodorant, bug spray, and hair spray  Do not smoke or be around others who smoke    Nicotine and other chemicals in cigarettes and cigars damages the cilia that move mucus out of your lungs  Ask your healthcare provider for information if you currently smoke and need help to quit  E-cigarettes or smokeless tobacco still contain nicotine  Talk to your healthcare provider before you use these products  Drink liquids as directed  Liquids help keep your air passages moist and help you cough up mucus  You may need to drink more liquids when you have acute bronchitis  Ask how much liquid to drink each day and which liquids are best for you  Use a humidifier or vaporizer  Use a cool mist humidifier or a vaporizer to increase air moisture in your home  This may make it easier for you to breathe and help decrease your cough  Prevent acute bronchitis by doing the following:   Get the vaccinations you need  Ask your healthcare provider if you should get vaccinated against the flu or pneumonia  Prevent the spread of germs  You can decrease your risk of acute bronchitis and other illnesses by doing the following:     Wash your hands often with soap and water  Carry germ-killing hand lotion or gel with you  You can use the lotion or gel to clean your hands when soap and water are not available  Do not touch your eyes, nose, or mouth unless you have washed your hands first     Always cover your mouth when you cough to prevent the spread of germs  It is best to cough into a tissue or your shirt sleeve instead of into your hand  Ask those around you cover their mouths when they cough  Try to avoid people who have a cold or the flu  If you are sick, stay away from others as much as possible  Medicines: Your healthcare provider may  give you any of the following:  Ibuprofen or acetaminophen  are medicines that help lower your fever  They are available without a doctor's order  Ask your healthcare provider which medicine is right for you  Ask how much to take and how often to take it  Follow directions  These medicines can cause stomach bleeding if not taken correctly  Ibuprofen can cause kidney damage  Do not take ibuprofen if you have kidney disease, an ulcer, or allergies to aspirin  Acetaminophen can cause liver damage  Do not take more than 4,000 milligrams in 24 hours  Decongestants  help loosen mucus in your lungs and make it easier to cough up  This can help you breathe easier  Cough suppressants  decrease your urge to cough  If your cough produces mucus, do not take a cough suppressant unless your healthcare provider tells you to  Your healthcare provider may suggest that you take a cough suppressant at night so you can rest     Inhalers  may be given  Your healthcare provider may give you one or more inhalers to help you breathe easier and cough less  An inhaler gives your medicine to open your airways  Ask your healthcare provider to show you how to use your inhaler correctly  Follow up with your healthcare provider as directed:  Write down questions you have so you will remember to ask them during your follow-up visits  © 2017 [facility] Information is for End User's use only and may not be sold, redistributed or otherwise used for commercial purposes  All illustrations and images included in CareNotes® are the copyrighted property of [employer]  or Koffi Hatfield  The above information is an  only  It is not intended as medical advice for individual conditions or treatments  Talk to your doctor, nurse or pharmacist before following any medical regimen to see if it is safe and effective for you

## 2023-02-13 NOTE — PROGRESS NOTES
Video Visit - Herlinda Crimes 56 y o  female MRN: [id number]    REQUIRED DOCUMENTATION:         1  [facility]  2  Provider located at [address] [place] [id number] [phone]  3  [facility] provider: ANTONINA Croft  4  Identify all parties in room with patient during [facility] visit:  patient  5  After connecting through [employer], patient was identified by name and date of birth  Patient was then informed that this was a Telemedicine visit and that the exam was being conducted confidentially over secure lines  My office door was closed  No one else was in the room  Patient acknowledged consent and understanding of privacy and security of the Telemedicine visit  I informed the patient that I have reviewed their record in Epic and presented the opportunity for them to ask any questions regarding the visit today  The patient agreed to participate  [facility] 56[facility]  She was seen 2 days ago and started on cough medication and claritin D  She states she is not feeling bettering  She continues to have cough  No fever, body aches or chills  She is not coughing up discolored mucous  Cough is barky and causing stress incontinence  No chest pain or sob  She states she states symptom started about 10 days ago  Review of Systems   Constitutional: Negative for activity change, chills and fatigue  HENT: Positive for congestion and rhinorrhea  Respiratory: Negative for chest tightness, shortness of breath and wheezing  Cardiovascular: Negative  Genitourinary:        Stress incontinence   Neurological: Negative  Psychiatric/Behavioral: Negative  Vitals:    02/13/23 0734   Resp: 16     Physical Exam  Constitutional:       General: She is not in acute distress  Appearance: Normal appearance  She is not ill-appearing or toxic-appearing     HENT:      Head: Normocephalic and atraumatic  Nose: Congestion present  Eyes:      Conjunctiva/sclera: Conjunctivae normal    Pulmonary:      Effort: Pulmonary effort is normal  No respiratory distress  Comments: No cough or audible wheezing   Deep barky cough    Skin:     Comments: No rash on head or neck  Neurological:      Mental Status: She is alert and oriented to person, place, and time  Psychiatric:         Mood and Affect: Mood normal          Behavior: Behavior normal          Thought Content: Thought content normal          Judgment: Judgment normal        Diagnoses and all orders for this visit:    Acute bronchitis, unspecified organism  -     predniSONE 20 mg tablet; Take 1 tablet (20 mg total) by mouth daily for 4 days  -     azithromycin (ZITHROMAX) 250 mg tablet; 2 tablets on day 1, 1 tablet day 2-5      Patient Instructions     Rest and drink extra fluids  Start antibiotic  Take probiotic  Start prednisone as prescribed  Cool mist humidification can be helpful  Follow up with PCP if no improvement  Go to ER with worsening symptoms  Acute Bronchitis   AMBULATORY CARE:   Acute bronchitis  is swelling and irritation in the air passages of your lungs  This irritation may cause you to cough or have other breathing problems  Acute bronchitis often starts because of another illness, such as a cold or the flu  The illness spreads from your nose and throat to your windpipe and airways  Bronchitis is often called a chest cold  Acute bronchitis lasts about 3 to 6 weeks and is usually not a serious illness  Your cough can last for several weeks  You may have any of the following symptoms:   · A cough with sputum that may be clear, yellow, or green    · Feeling more tired than usual, and body aches    · A fever and chills    · Wheezing when you breathe    · A tight chest or pain when you breathe or cough  Seek care immediately if:   · You cough up blood  · Your lips or fingernails turn blue      · You feel like you are not getting enough air when you breathe  Contact your healthcare provider if:   · You have a fever  · Your breathing problems do not go away or get worse  · Your cough does not get better within 4 weeks  · You have questions or concerns about your condition or care  Self-care:   · Get more rest   Rest helps your body to heal  Slowly start to do more each day  Rest when you feel it is needed  · Avoid irritants in the air  Avoid chemicals, fumes, and dust  Wear a face mask if you must work around dust or fumes  Stay inside on days when air pollution levels are high  If you have allergies, stay inside when pollen counts are high  Do not use aerosol products, such as spray-on deodorant, bug spray, and hair spray  · Do not smoke or be around others who smoke  Nicotine and other chemicals in cigarettes and cigars damages the cilia that move mucus out of your lungs  Ask your healthcare provider for information if you currently smoke and need help to quit  E-cigarettes or smokeless tobacco still contain nicotine  Talk to your healthcare provider before you use these products  · Drink liquids as directed  Liquids help keep your air passages moist and help you cough up mucus  You may need to drink more liquids when you have acute bronchitis  Ask how much liquid to drink each day and which liquids are best for you  · Use a humidifier or vaporizer  Use a cool mist humidifier or a vaporizer to increase air moisture in your home  This may make it easier for you to breathe and help decrease your cough  Prevent acute bronchitis by doing the following:   · Get the vaccinations you need  Ask your healthcare provider if you should get vaccinated against the flu or pneumonia  · Prevent the spread of germs  You can decrease your risk of acute bronchitis and other illnesses by doing the following:     [facility] your hands often with soap and water  Carry germ-killing hand lotion or gel with you   You can use the lotion or gel to clean your hands when soap and water are not available  ¨ Do not touch your eyes, nose, or mouth unless you have washed your hands first     ¨ Always cover your mouth when you cough to prevent the spread of germs  It is best to cough into a tissue or your shirt sleeve instead of into your hand  Ask those around you cover their mouths when they cough  ¨ Try to avoid people who have a cold or the flu  If you are sick, stay away from others as much as possible  Medicines: Your healthcare provider may  give you any of the following:  · Ibuprofen or acetaminophen  are medicines that help lower your fever  They are available without a doctor's order  Ask your healthcare provider which medicine is right for you  Ask how much to take and how often to take it  Follow directions  These medicines can cause stomach bleeding if not taken correctly  Ibuprofen can cause kidney damage  Do not take ibuprofen if you have kidney disease, an ulcer, or allergies to aspirin  Acetaminophen can cause liver damage  Do not take more than 4,000 milligrams in 24 hours  · Decongestants  help loosen mucus in your lungs and make it easier to cough up  This can help you breathe easier  · Cough suppressants  decrease your urge to cough  If your cough produces mucus, do not take a cough suppressant unless your healthcare provider tells you to  Your healthcare provider may suggest that you take a cough suppressant at night so you can rest     · Inhalers  may be given  Your healthcare provider may give you one or more inhalers to help you breathe easier and cough less  An inhaler gives your medicine to open your airways  Ask your healthcare provider to show you how to use your inhaler correctly  Follow up with your healthcare provider as directed:  Write down questions you have so you will remember to ask them during your follow-up visits    © 2017 [employer]te[employer] Information is for End User's use only and may not be sold, redistributed or otherwise used for commercial purposes  All illustrations and images included in CareNotes® are the copyrighted property of A D A M , Inc  or Koffi Hatfield  The above information is an  only  It is not intended as medical advice for individual conditions or treatments  Talk to your doctor, nurse or pharmacist before following any medical regimen to see if it is safe and effective for you  Follow up with PCP if not improved, if symptoms are worse, go to the ER

## 2023-02-13 NOTE — CARE ANYWHERE EVISITS
Visit Summary for Darlene Glasgow - Gender: Female - Date of Birth: [id number]  Date: [id number] - Duration: 8 minutes  Patient: Darlene Glasgow  Provider: Jhon PRITCHARD    Patient Contact Information  Address  [address] [place] [address]; [address] [place] [address]  [phone]    Visit Topics  Cold [Added By: Self - 2023-02-13]    Triage Questions   What is your current physical address in the event of a medical emergency? Answer []  Are you allergic to any medications? Answer []  Are you now or could you be pregnant? Answer []  Do you have any immune system compromise or chronic lung   disease? Answer []  Do you have any vulnerable family members in the home (infant, pregnant, cancer, elderly)? Answer []     Conversation Transcripts  [0A][0A] [Notification] You are connected with Shelli Ruvalcaba, [facility] [5E][OSNLSLDCGJHE] Ata Watkins is located in [place]  [8K][[id number]] Ata Watkins has shared health history  Reyna Keita  [0A]    Diagnosis  Acute bronchitis    Procedures  Value: [id number] Code: CPT-4 UNLISTED E&M SERVICE    Medications Prescribed    No prescriptions ordered    Electronically signed by: Shelli Cotton(NPI [id number])

## 2023-04-04 DIAGNOSIS — Z30.41 ENCOUNTER FOR SURVEILLANCE OF CONTRACEPTIVE PILLS: ICD-10-CM

## 2023-04-05 ENCOUNTER — TELEPHONE (OUTPATIENT)
Dept: OBGYN CLINIC | Facility: CLINIC | Age: 48
End: 2023-04-05

## 2023-04-05 NOTE — TELEPHONE ENCOUNTER
Patient was calling about her birth control, was told that [facility] computers were down and she was not able to pick it up.

## 2023-04-06 RX ORDER — ACETAMINOPHEN AND CODEINE PHOSPHATE 120; 12 MG/5ML; MG/5ML
1 SOLUTION ORAL DAILY
Qty: 84 TABLET | Refills: 4 | Status: SHIPPED | OUTPATIENT
Start: 2023-04-06

## 2023-04-06 NOTE — TELEPHONE ENCOUNTER
Patient called back and said she would like it sent to [facility] on [facility]. Re-sent it over for provider to sign off on.

## 2023-05-25 ENCOUNTER — TELEPHONE (OUTPATIENT)
Dept: OBGYN CLINIC | Facility: CLINIC | Age: 48
End: 2023-05-25

## 2023-05-25 NOTE — TELEPHONE ENCOUNTER
Patient called wants to know why you prefer the Mirena over the Nexplanon for her  And will she gain weight with either of these?

## 2023-05-25 NOTE — TELEPHONE ENCOUNTER
Patient is interested in the [address]  Told her someone will be checking with her insurance to see if covered and will give her a call back to discuss/ schedule  Patient also was told to get an ultrasound prior to Martin insert

## 2024-03-20 NOTE — PROGRESS NOTES
Assessment/Plan   Problem List Items Addressed This Visit    None  Visit Diagnoses       Well woman exam    -  Primary    Relevant Orders    Liquid-based pap, screening    TROY (stress urinary incontinence, female)        Relevant Orders    Ambulatory Referral to Physical Therapy            Discussion    All questions have been answered to her satisfaction  RTO for APE or sooner if needed  Pap done.   We reviewed her TROY. Advised bladder irritants to avoid, including but not limited to alcohol, caffeine, tobacco and acidic foods/beverasges, advised timed bladder emptying, weight loss and kegels. We discussed pelvic floor PT consult which she is open to.   Will plan to watch menses for now and call with sx if she desires further discussion of tx options.         Subjective     HPI   Dilcia Csatillo is a 48 y.o. female who presents for annual well woman exam.     LMP - 2/29/24 ;  Pt has stopped taking her OCPs.   Pt has declined the Mirena at this point as well. Periods are still crampier, some months better than other. Some months with no cramping. Coming monthly, they are lasting 3-4 days. Will continue to watch for now.     No vulvar itch/burn; No vaginal itch/burn; No abn discharge or odor; No urinary sx - burning/pain/frequency/hematuria    No concerning breast masses, asymmetry, nipple discharge or bleeding, changes in skin of breast, or breast tenderness bilaterally    No abd/pelvic pain or HAs;     No menopausal symptoms.    Pt is sexually active in a mutually monog/ sexual relationship; Partner w ED, so not often active. She declines sti/hiv/hep testing; Feels safe at home      (+) PCP for routine Bw/care;    Last Pap : 9/14/20 WNL neg HPV   History of abnormal Pap smear: + HPV   Mammo:3/6/24 BIRADs 2         Review of Systems   Constitutional: Negative.    Respiratory: Negative.     Gastrointestinal: Negative.    Endocrine: Negative.    Genitourinary: Negative.        The following portions of  the patient's history were reviewed and updated as appropriate: allergies, current medications, past family history, past medical history, past social history, past surgical history, and problem list.         OB History          1    Para   1    Term   1            AB        Living   1         SAB        IAB        Ectopic        Multiple        Live Births   1                 Past Medical History:   Diagnosis Date    Depression        Past Surgical History:   Procedure Laterality Date    BREAST BIOPSY Left     MOUTH SURGERY      US GUIDANCE BREAST BIOPSY RIGHT EACH ADDITIONAL Right 2019    US GUIDED BREAST BIOPSY RIGHT COMPLETE Right 2019       Family History   Problem Relation Age of Onset    Hypertension Mother     Hypertension Father     Diabetes Maternal Grandmother     Hypertension Maternal Grandmother     Diabetes Maternal Grandfather     Hypertension Maternal Grandfather     Diabetes Paternal Grandmother     Heart disease Paternal Grandmother     Hypertension Paternal Grandmother     Diabetes Paternal Grandfather     Hypertension Paternal Grandfather     Lung cancer Maternal Aunt     No Known Problems Sister     No Known Problems Daughter     No Known Problems Maternal Aunt     No Known Problems Maternal Aunt     No Known Problems Maternal Aunt     No Known Problems Maternal Aunt     No Known Problems Maternal Aunt     No Known Problems Paternal Aunt     No Known Problems Paternal Aunt     No Known Problems Paternal Aunt        Social History     Socioeconomic History    Marital status: /Civil Union     Spouse name: Not on file    Number of children: Not on file    Years of education: Not on file    Highest education level: Not on file   Occupational History    Not on file   Tobacco Use    Smoking status: Former     Current packs/day: 0.00     Types: Cigarettes     Quit date:      Years since quittin.2    Smokeless tobacco: Never    Tobacco comments:     social as  "teenager   Vaping Use    Vaping status: Never Used   Substance and Sexual Activity    Alcohol use: Yes     Comment: occ    Drug use: Never    Sexual activity: Yes     Partners: Male     Birth control/protection: OCP   Other Topics Concern    Not on file   Social History Narrative    Not on file     Social Determinants of Health     Financial Resource Strain: Not on file   Food Insecurity: Not on file   Transportation Needs: Not on file   Physical Activity: Not on file   Stress: Not on file   Social Connections: Not on file   Intimate Partner Violence: Not on file   Housing Stability: Not on file         Current Outpatient Medications:     aspirin-acetaminophen-caffeine (EXCEDRIN MIGRAINE) 250-250-65 MG per tablet, Take 4 tablets by mouth every 6 (six) hours as needed, Disp: , Rfl:     losartan (COZAAR) 25 mg tablet, Take 25 mg by mouth daily, Disp: , Rfl:     magnesium Oxide (MagOx 400) 400 mg TABS, Take 400 mg by mouth daily, Disp: , Rfl:     benzonatate (TESSALON) 200 MG capsule, Take 1 capsule (200 mg total) by mouth 3 (three) times a day as needed for cough (Patient not taking: Reported on 3/25/2024), Disp: 20 capsule, Rfl: 0    brompheniramine-pseudoephedrine (DIMETAPP) 1-15 MG/5ML ELIX, Take 10 mL by mouth every 6 (six) hours as needed for allergies (Patient not taking: Reported on 3/25/2024), Disp: 236 mL, Rfl: 0    chlorhexidine (PERIDEX) 0.12 % solution, , Disp: , Rfl:     norethindrone (MICRONOR) 0.35 MG tablet, Take 1 tablet (0.35 mg total) by mouth daily (Patient not taking: Reported on 3/25/2024), Disp: 84 tablet, Rfl: 4    No Known Allergies    Objective   Vitals:    03/25/24 1450   BP: 114/80   BP Location: Left arm   Patient Position: Sitting   Cuff Size: Large   Weight: 108 kg (237 lb 9.6 oz)   Height: 5' 5\" (1.651 m)     Physical Exam  Vitals reviewed.   HENT:      Head: Normocephalic and atraumatic.   Cardiovascular:      Rate and Rhythm: Normal rate and regular rhythm.   Pulmonary:      Effort: " Pulmonary effort is normal.      Breath sounds: Normal breath sounds.   Chest:   Breasts:     Breasts are symmetrical.      Right: No swelling, bleeding, inverted nipple, mass, nipple discharge, skin change or tenderness.      Left: No swelling, bleeding, inverted nipple, mass, nipple discharge, skin change or tenderness.   Abdominal:      General: Abdomen is flat. Bowel sounds are normal.      Palpations: Abdomen is soft.      Tenderness: There is no abdominal tenderness. There is no right CVA tenderness, left CVA tenderness or guarding.   Genitourinary:     General: Normal vulva.      Pubic Area: No rash.       Labia:         Right: No rash, tenderness, lesion or injury.         Left: No rash, tenderness, lesion or injury.       Urethra: No prolapse, urethral pain, urethral swelling or urethral lesion.      Vagina: Normal. No signs of injury and foreign body. No vaginal discharge or erythema.      Cervix: Normal.      Uterus: Normal.       Adnexa: Right adnexa normal and left adnexa normal.   Musculoskeletal:      Cervical back: Neck supple.   Lymphadenopathy:      Upper Body:      Right upper body: No axillary adenopathy.      Left upper body: No axillary adenopathy.   Skin:     General: Skin is warm and dry.   Neurological:      Mental Status: She is alert and oriented to person, place, and time.   Psychiatric:         Mood and Affect: Mood normal.         Behavior: Behavior normal.         Thought Content: Thought content normal.         Judgment: Judgment normal.         There are no Patient Instructions on file for this visit.

## 2024-03-25 ENCOUNTER — ANNUAL EXAM (OUTPATIENT)
Dept: OBGYN CLINIC | Facility: CLINIC | Age: 49
End: 2024-03-25
Payer: COMMERCIAL

## 2024-03-25 VITALS
WEIGHT: 237.6 LBS | SYSTOLIC BLOOD PRESSURE: 114 MMHG | BODY MASS INDEX: 39.58 KG/M2 | HEIGHT: 65 IN | DIASTOLIC BLOOD PRESSURE: 80 MMHG

## 2024-03-25 DIAGNOSIS — N39.3 SUI (STRESS URINARY INCONTINENCE, FEMALE): ICD-10-CM

## 2024-03-25 DIAGNOSIS — Z01.419 WELL WOMAN EXAM: Primary | ICD-10-CM

## 2024-03-25 PROBLEM — I10 PRIMARY HYPERTENSION: Status: ACTIVE | Noted: 2023-08-25

## 2024-03-25 PROCEDURE — G0145 SCR C/V CYTO,THINLAYER,RESCR: HCPCS | Performed by: PATHOLOGY

## 2024-03-25 PROCEDURE — G0476 HPV COMBO ASSAY CA SCREEN: HCPCS | Performed by: PHYSICIAN ASSISTANT

## 2024-03-25 PROCEDURE — G0124 SCREEN C/V THIN LAYER BY MD: HCPCS | Performed by: PATHOLOGY

## 2024-03-25 PROCEDURE — S0612 ANNUAL GYNECOLOGICAL EXAMINA: HCPCS | Performed by: PHYSICIAN ASSISTANT

## 2024-03-25 RX ORDER — LOSARTAN POTASSIUM 25 MG/1
25 TABLET ORAL DAILY
COMMUNITY
Start: 2024-03-22 | End: 2025-03-22

## 2024-03-25 RX ORDER — LANOLIN ALCOHOL/MO/W.PET/CERES
400 CREAM (GRAM) TOPICAL DAILY
COMMUNITY
Start: 2024-02-21 | End: 2025-02-20

## 2024-03-26 LAB
HPV HR 12 DNA CVX QL NAA+PROBE: POSITIVE
HPV16 DNA CVX QL NAA+PROBE: NEGATIVE
HPV18 DNA CVX QL NAA+PROBE: NEGATIVE

## 2024-04-02 LAB
LAB AP GYN PRIMARY INTERPRETATION: ABNORMAL
Lab: ABNORMAL
PATH INTERP SPEC-IMP: ABNORMAL

## 2024-04-17 ENCOUNTER — PROCEDURE VISIT (OUTPATIENT)
Dept: OBGYN CLINIC | Facility: CLINIC | Age: 49
End: 2024-04-17
Payer: COMMERCIAL

## 2024-04-17 VITALS
WEIGHT: 236 LBS | DIASTOLIC BLOOD PRESSURE: 90 MMHG | BODY MASS INDEX: 39.32 KG/M2 | HEIGHT: 65 IN | SYSTOLIC BLOOD PRESSURE: 142 MMHG

## 2024-04-17 DIAGNOSIS — R87.610 ASCUS WITH POSITIVE HIGH RISK HPV CERVICAL: Primary | ICD-10-CM

## 2024-04-17 DIAGNOSIS — R87.810 ASCUS WITH POSITIVE HIGH RISK HPV CERVICAL: Primary | ICD-10-CM

## 2024-04-17 DIAGNOSIS — Z32.02 URINE PREGNANCY TEST NEGATIVE: ICD-10-CM

## 2024-04-17 LAB — SL AMB POCT URINE HCG: NORMAL

## 2024-04-17 PROCEDURE — 88305 TISSUE EXAM BY PATHOLOGIST: CPT | Performed by: STUDENT IN AN ORGANIZED HEALTH CARE EDUCATION/TRAINING PROGRAM

## 2024-04-17 PROCEDURE — 88342 IMHCHEM/IMCYTCHM 1ST ANTB: CPT | Performed by: STUDENT IN AN ORGANIZED HEALTH CARE EDUCATION/TRAINING PROGRAM

## 2024-04-17 PROCEDURE — 81025 URINE PREGNANCY TEST: CPT | Performed by: STUDENT IN AN ORGANIZED HEALTH CARE EDUCATION/TRAINING PROGRAM

## 2024-04-17 PROCEDURE — 57456 ENDOCERV CURETTAGE W/SCOPE: CPT | Performed by: STUDENT IN AN ORGANIZED HEALTH CARE EDUCATION/TRAINING PROGRAM

## 2024-04-17 NOTE — PATIENT INSTRUCTIONS
Colposcopy   WHAT YOU NEED TO KNOW:   You may have light bleeding or spotting after the procedure. If a biopsy was taken, you may have cramping and bleeding for several days. If medicine was used to control bleeding, you may have brown or black discharge.  DISCHARGE INSTRUCTIONS:   Seek care immediately if:   You have severe pain in your lower abdomen.    You soak through 1 sanitary pad in 1 hour or less.    You feel weak, dizzy, or faint.    Call your doctor or gynecologist if:   You have a fever, chills, or foul-smelling discharge.    You have bleeding with clots.    Your pain gets worse or does not get better after you take pain medicine.    You have questions or concerns about your condition or care.    Medicines:   NSAIDs , such as ibuprofen, help decrease swelling, pain, and fever. NSAIDs can cause stomach bleeding or kidney problems in certain people. If you take blood thinner medicine, always ask your healthcare provider if NSAIDs are safe for you. Always read the medicine label and follow directions.    Take your medicine as directed.  Contact your healthcare provider if you think your medicine is not helping or if you have side effects. Tell your provider if you are allergic to any medicine. Keep a list of the medicines, vitamins, and herbs you take. Include the amounts, and when and why you take them. Bring the list or the pill bottles to follow-up visits. Carry your medicine list with you in case of an emergency.    Self-care:  If tissue samples were not taken, you can go back to your usual activities. Your healthcare provider will give you specific directions if samples were taken. The following are general guidelines:  Rest for 24 hours, or as directed.  Do not exercise, play sports, or lift anything heavier than 5 pounds. Ask your provider when you can return to your usual activities.    Do not put anything in your vagina for 2 weeks, or as directed.  Do not douche, use medicines in your vagina, or  have sex. Do not use tampons. Wear sanitary pads for bleeding. Your provider will tell you when it is okay to do these again.    Do not smoke.  Smoking increases your risk for cervical cancer. Ask your healthcare provider for information if you currently smoke and need help to quit. E-cigarettes or smokeless tobacco still contain nicotine. Talk to your healthcare provider before you use these products.    Follow up with your doctor or gynecologist as directed:  Write down your questions so you remember to ask them during your visits.  © Copyright [employer] 2023 Information is for End User's use only and may not be sold, redistributed or otherwise used for commercial purposes.  The above information is an  only. It is not intended as medical advice for individual conditions or treatments. Talk to your doctor, nurse or pharmacist before following any medical regimen to see if it is safe and effective for you.

## 2024-04-17 NOTE — PROGRESS NOTES
"COLPOSCOPY PROCEDURE NOTE  Dilcia Castillo  [id number]  2024  3:49 PM    Subjective   Dilcia Castillo is a 48 y.o.  with abnormal pap result presenting for colposcopy.   She is currently without complaint.   Patient is not a smoker.  She has not received the HPV vaccination series.  Urine pregnancy test confirmed negative today.    Pap History:  3/25/2024- ASCUS, other HR HPV pos  2020- NILM, HPV neg  2019- NILM, other HR positive HPV    Reports remote history of HPV and colposcopy in the past- denies any need for cryoablation or LEEP in past.    The following portions of the patient's history were reviewed and updated as appropriate: allergies, current medications, past family history, past medical history, past social history, past surgical history, and problem list.      Objective  /90 (BP Location: Left arm, Patient Position: Sitting, Cuff Size: Large)   Ht 5' 5\" (1.651 m)   Wt 107 kg (236 lb)   LMP 2024 (Within Days)   BMI 39.27 kg/m²      Physical Exam:  Physical Exam  Vitals reviewed.   Constitutional:       Appearance: Normal appearance. She is not ill-appearing or diaphoretic.   Cardiovascular:      Rate and Rhythm: Normal rate.   Pulmonary:      Effort: Pulmonary effort is normal. No respiratory distress.   Genitourinary:     Comments: Vulva normal without lesions, urethra midline without prolapse.  Normal Bartholin and Neskowin's glands.  On speculum exam the vagina appears grossly normal without lesions, cervix appears small with no lesions identified.  Following acetic acid no changes noted concerning for biopsy, however, ECC completed.  Musculoskeletal:      Right lower leg: No edema.      Left lower leg: No edema.   Skin:     General: Skin is warm and dry.   Neurological:      Mental Status: She is alert and oriented to person, place, and time.   Psychiatric:         Mood and Affect: Mood normal.         Behavior: Behavior normal.          Colposcopy   "   Date/Time  2024 3:30 PM     Universal Protocol   Consent: Verbal consent obtained.  Risks and benefits: risks, benefits and alternatives were discussed  Consent given by: patient     Performed by  Cyndie Adhikari MD   Authorized by  Cyndie Adhikari MD     Pre-procedure details      Prepped with: acetic acid     Indication    ASC-US   Procedure Details   Procedure: Colposcopy w/ endocervical curettage      Under satisfactory analgesia the patient was prepped and draped in the dorsal lithotomy position: yes      [place] speculum was placed in the vagina: yes      Under colposcopic examination the transition zone was seen in entirety: yes      Intracervical block was performed: no      Endocervix was curetted using a Kevorkian curette: yes      Tampon inserted: no      Monsel's solution was applied: no      Biopsy(s): no      Specimen to pathology: yes     Post-procedure      Impression: Low grade cervical dysplasia      Patient tolerance of procedure:  Tolerated well, no immediate complications         Assessment/ Plan  48 y.o.  presenting with ASCUS, other HR positive HPV pap.   Colposcopy performed, impression: low grade to no changes.   Will call with ECC results

## 2024-04-23 PROCEDURE — 88305 TISSUE EXAM BY PATHOLOGIST: CPT | Performed by: STUDENT IN AN ORGANIZED HEALTH CARE EDUCATION/TRAINING PROGRAM

## 2024-04-23 PROCEDURE — 88342 IMHCHEM/IMCYTCHM 1ST ANTB: CPT | Performed by: STUDENT IN AN ORGANIZED HEALTH CARE EDUCATION/TRAINING PROGRAM

## 2024-08-07 DIAGNOSIS — Z30.41 ENCOUNTER FOR SURVEILLANCE OF CONTRACEPTIVE PILLS: ICD-10-CM

## 2024-08-07 RX ORDER — ACETAMINOPHEN AND CODEINE PHOSPHATE 120; 12 MG/5ML; MG/5ML
1 SOLUTION ORAL DAILY
Qty: 84 TABLET | Refills: 1 | Status: SHIPPED | OUTPATIENT
Start: 2024-08-07

## 2024-08-31 DIAGNOSIS — Z30.41 ENCOUNTER FOR SURVEILLANCE OF CONTRACEPTIVE PILLS: ICD-10-CM

## 2024-09-01 RX ORDER — NORETHINDRONE 0.35 MG/1
1 TABLET ORAL DAILY
Qty: 84 TABLET | Refills: 1 | Status: SHIPPED | OUTPATIENT
Start: 2024-09-01

## 2025-03-30 NOTE — PROGRESS NOTES
Assessment & Plan   Problem List Items Addressed This Visit    None  Visit Diagnoses       Routine gynecological examination    -  Primary    Relevant Orders    Liquid-based pap, screening      HPV in female        Relevant Orders    Liquid-based pap, screening      Encounter for screening mammogram for malignant neoplasm of breast        Relevant Orders    Mammo screening bilateral w 3d and cad            Discussion    All questions have been answered to her satisfaction  RTO for APE or sooner if needed  Pap done.   We reviewed option to offer additional tx for TROY to see urogyn but she is not interested in that tx option at this point. Will consider and let us know if she desires referral.  We reviewed OTC products that may help with her brain fog. Encouraged trial of ashwagandha 300 mg BID. Pt will consider.         Subjective     HPI   Dilcia Castillo is a 49 y.o. female who presents for annual well woman exam.     LMP - 03/10/25 ; Periods vary, some months heavier than others. Last period was very light. Still coming q month.   Denies VMS.   She does note some brain fog as well as increased PMS sx.     + TROY-she saw pelvic floor PT but did not have much success. We reviewed option to see urogyn but she is not interested in that tx option at this point. Will consider and let us know if she desires referral.     No concerning breast masses, asymmetry, nipple discharge or bleeding, changes in skin of breast, or breast tenderness bilaterally    No abd/pelvic pain or HAs;     Pt is sexually active in a mutually monog/ sexual relationship; No issues with intercourse; She declines sti/hiv/hep testing; Feels safe at home  Current contraception: NA-pt does not often have penetrative IC that results in ejaculation for her partner.     (+) PCP for routine Bw/care;    Last Pap : 3/25/24 ASCUS + non 16/18 HPV, colpo benign   Mammo:3/6/24 BIRADS 2   Abnormal mammo: h/o b/l benign biopsies   Colonoscopy:has kit  for cologuard but has not yet submitted    Review of Systems   Constitutional: Negative.    Respiratory: Negative.     Gastrointestinal: Negative.    Endocrine: Negative.    Genitourinary:  Positive for enuresis.       The following portions of the patient's history were reviewed and updated as appropriate: allergies, current medications, past family history, past medical history, past social history, past surgical history, and problem list.         OB History        1    Para   1    Term   1            AB        Living   1       SAB        IAB        Ectopic        Multiple        Live Births   1                 Past Medical History:   Diagnosis Date   • Depression        Past Surgical History:   Procedure Laterality Date   • BREAST BIOPSY Left    • MOUTH SURGERY     • US GUIDANCE BREAST BIOPSY RIGHT EACH ADDITIONAL Right 2019   • US GUIDED BREAST BIOPSY RIGHT COMPLETE Right 2019       Family History   Problem Relation Age of Onset   • Hypertension Mother    • Hypertension Father    • Diabetes Maternal Grandmother    • Hypertension Maternal Grandmother    • Diabetes Maternal Grandfather    • Hypertension Maternal Grandfather    • Diabetes Paternal Grandmother    • Heart disease Paternal Grandmother    • Hypertension Paternal Grandmother    • Diabetes Paternal Grandfather    • Hypertension Paternal Grandfather    • Lung cancer Maternal Aunt    • No Known Problems Sister    • No Known Problems Daughter    • No Known Problems Maternal Aunt    • No Known Problems Maternal Aunt    • No Known Problems Maternal Aunt    • No Known Problems Maternal Aunt    • No Known Problems Maternal Aunt    • No Known Problems Paternal Aunt    • No Known Problems Paternal Aunt    • No Known Problems Paternal Aunt        Social History     Socioeconomic History   • Marital status: /Civil Union     Spouse name: Not on file   • Number of children: Not on file   • Years of education: Not on file   • Highest  education level: Not on file   Occupational History   • Not on file   Tobacco Use   • Smoking status: Former     Current packs/day: 0.00     Types: Cigarettes     Quit date:      Years since quittin.2   • Smokeless tobacco: Never   • Tobacco comments:     social as teenager   Vaping Use   • Vaping status: Never Used   Substance and Sexual Activity   • Alcohol use: Yes     Comment: occ   • Drug use: Never   • Sexual activity: Yes     Partners: Male     Birth control/protection: None   Other Topics Concern   • Not on file   Social History Narrative   • Not on file     Social Drivers of Health     Financial Resource Strain: Medium Risk (12/3/2024)    Received from [facility]    Overall Financial Resource Strain (CARDIA)    • Difficulty of Paying Living Expenses: Somewhat hard   Food Insecurity: Food Insecurity Present (12/3/2024)    Received from [facility]    Hunger Vital Sign    • Worried About Running Out of Food in the Last Year: Sometimes true    • Ran Out of Food in the Last Year: Sometimes true   Transportation Needs: No Transportation Needs (12/3/2024)    Received from [facility]    PRAPARE - Transportation    • Lack of Transportation (Medical): No    • Lack of Transportation (Non-Medical): No   Physical Activity: Not on file   Stress: Not on file   Social Connections: Feeling Somewhat Isolated (12/3/2024)    Received from [facility]    OASIS : Social Isolation    • How often do you feel lonely or isolated from those around you?: Sometimes   Intimate Partner Violence: Not At Risk (12/3/2024)    Received from [facility]    Humiliation, Afraid, Rape, and Kick questionnaire    • Fear of Current or Ex-Partner: No    • Emotionally Abused: No    • Physically Abused: No    • Sexually Abused: No   Housing Stability: Low Risk  (12/3/2024)    Received from [facility]    Housing Stability Vital Sign     • Unable to Pay for Housing in the Last Year: No    • Number of Times Moved in the Last Year: 0    • Homeless in the Last Year: No         Current Outpatient Medications:   •  amLODIPine (NORVASC) 5 mg tablet, Take 5 mg by mouth daily, Disp: , Rfl:   •  aspirin-acetaminophen-caffeine (EXCEDRIN MIGRAINE) 250-250-65 MG per tablet, Take 4 tablets by mouth every 6 (six) hours as needed, Disp: , Rfl:   •  Breztri Aerosphere 160-9-4.8 MCG/ACT AERO, Inhale 2 puffs 2 (two) times a day, Disp: , Rfl:   •  losartan (COZAAR) 25 mg tablet, Take 25 mg by mouth daily, Disp: , Rfl:   •  montelukast (SINGULAIR) 10 mg tablet, Take 10 mg by mouth, Disp: , Rfl:   •  Zepbound 2.5 MG/0.5ML auto-injector, , Disp: , Rfl:   •  benzonatate (TESSALON) 200 MG capsule, Take 1 capsule (200 mg total) by mouth 3 (three) times a day as needed for cough (Patient not taking: Reported on 4/1/2025), Disp: 20 capsule, Rfl: 0  •  brompheniramine-pseudoephedrine (DIMETAPP) 1-15 MG/5ML ELIX, Take 10 mL by mouth every 6 (six) hours as needed for allergies (Patient not taking: Reported on 4/1/2025), Disp: 236 mL, Rfl: 0  •  chlorhexidine (PERIDEX) 0.12 % solution, , Disp: , Rfl:   •  magnesium Oxide (MagOx 400) 400 mg TABS, Take 400 mg by mouth daily (Patient not taking: Reported on 4/1/2025), Disp: , Rfl:   •  norethindrone (Incassia) 0.35 MG tablet, TAKE 1 TABLET BY MOUTH EVERY DAY (Patient not taking: Reported on 4/1/2025), Disp: 84 tablet, Rfl: 1    No Known Allergies    Objective   Vitals:    04/01/25 0920   BP: 118/80   BP Location: Left arm   Patient Position: Sitting   Cuff Size: Large   Weight: 96.6 kg (213 lb)     Physical Exam  Vitals reviewed.   HENT:      Head: Normocephalic and atraumatic.   Cardiovascular:      Rate and Rhythm: Normal rate and regular rhythm.   Pulmonary:      Effort: Pulmonary effort is normal.      Breath sounds: Normal breath sounds.   Chest:   Breasts:     Breasts are symmetrical.      Right: No swelling, bleeding,  inverted nipple, mass, nipple discharge, skin change or tenderness.      Left: No swelling, bleeding, inverted nipple, mass, nipple discharge, skin change or tenderness.   Abdominal:      General: Abdomen is flat. Bowel sounds are normal.      Palpations: Abdomen is soft.      Tenderness: There is no abdominal tenderness. There is no right CVA tenderness, left CVA tenderness or guarding.   Genitourinary:     General: Normal vulva.      Pubic Area: No rash.       Labia:         Right: No rash, tenderness, lesion or injury.         Left: No rash, tenderness, lesion or injury.       Urethra: No prolapse, urethral pain, urethral swelling or urethral lesion.      Vagina: Normal. No signs of injury and foreign body. No vaginal discharge or erythema.      Cervix: Normal.      Uterus: Normal.       Adnexa: Right adnexa normal and left adnexa normal.   Musculoskeletal:      Cervical back: Neck supple.   Lymphadenopathy:      Upper Body:      Right upper body: No axillary adenopathy.      Left upper body: No axillary adenopathy.   Skin:     General: Skin is warm and dry.   Neurological:      Mental Status: She is alert and oriented to person, place, and time.   Psychiatric:         Mood and Affect: Mood normal.         Behavior: Behavior normal.         Thought Content: Thought content normal.         Judgment: Judgment normal.         There are no Patient Instructions on file for this visit.

## 2025-04-01 ENCOUNTER — ANNUAL EXAM (OUTPATIENT)
Dept: OBGYN CLINIC | Facility: CLINIC | Age: 50
End: 2025-04-01
Payer: COMMERCIAL

## 2025-04-01 VITALS — BODY MASS INDEX: 35.45 KG/M2 | DIASTOLIC BLOOD PRESSURE: 80 MMHG | SYSTOLIC BLOOD PRESSURE: 118 MMHG | WEIGHT: 213 LBS

## 2025-04-01 DIAGNOSIS — Z01.419 ROUTINE GYNECOLOGICAL EXAMINATION: Primary | ICD-10-CM

## 2025-04-01 DIAGNOSIS — B97.7 HPV IN FEMALE: ICD-10-CM

## 2025-04-01 DIAGNOSIS — Z12.31 ENCOUNTER FOR SCREENING MAMMOGRAM FOR MALIGNANT NEOPLASM OF BREAST: ICD-10-CM

## 2025-04-01 PROCEDURE — G0145 SCR C/V CYTO,THINLAYER,RESCR: HCPCS | Performed by: PHYSICIAN ASSISTANT

## 2025-04-01 PROCEDURE — S0612 ANNUAL GYNECOLOGICAL EXAMINA: HCPCS | Performed by: PHYSICIAN ASSISTANT

## 2025-04-01 PROCEDURE — G0476 HPV COMBO ASSAY CA SCREEN: HCPCS | Performed by: PHYSICIAN ASSISTANT

## 2025-04-01 RX ORDER — TIRZEPATIDE 2.5 MG/.5ML
INJECTION, SOLUTION SUBCUTANEOUS
COMMUNITY
Start: 2025-01-28

## 2025-04-01 RX ORDER — MONTELUKAST SODIUM 10 MG/1
10 TABLET ORAL
COMMUNITY
Start: 2025-02-12

## 2025-04-01 RX ORDER — AMLODIPINE BESYLATE 5 MG/1
5 TABLET ORAL DAILY
COMMUNITY
Start: 2024-12-25

## 2025-04-01 RX ORDER — BUDESONIDE, GLYCOPYRROLATE, AND FORMOTEROL FUMARATE 160; 9; 4.8 UG/1; UG/1; UG/1
2 AEROSOL, METERED RESPIRATORY (INHALATION) 2 TIMES DAILY
COMMUNITY
Start: 2025-02-12

## 2025-04-02 LAB
HPV HR 12 DNA CVX QL NAA+PROBE: NEGATIVE
HPV16 DNA CVX QL NAA+PROBE: NEGATIVE
HPV18 DNA CVX QL NAA+PROBE: NEGATIVE

## 2025-04-03 ENCOUNTER — RESULTS FOLLOW-UP (OUTPATIENT)
Dept: OBGYN CLINIC | Facility: CLINIC | Age: 50
End: 2025-04-03

## 2025-04-08 LAB
LAB AP GYN PRIMARY INTERPRETATION: NORMAL
Lab: NORMAL